# Patient Record
Sex: FEMALE | Race: BLACK OR AFRICAN AMERICAN | Employment: UNEMPLOYED | URBAN - METROPOLITAN AREA
[De-identification: names, ages, dates, MRNs, and addresses within clinical notes are randomized per-mention and may not be internally consistent; named-entity substitution may affect disease eponyms.]

---

## 2020-12-08 LAB
HBA1C MFR BLD HPLC: 6.7 %
LDL-C, EXTERNAL: 144

## 2021-01-26 ENCOUNTER — OFFICE VISIT (OUTPATIENT)
Dept: INTERNAL MEDICINE CLINIC | Age: 66
End: 2021-01-26
Payer: MEDICAID

## 2021-01-26 VITALS
TEMPERATURE: 99.1 F | HEIGHT: 62 IN | WEIGHT: 197 LBS | HEART RATE: 57 BPM | SYSTOLIC BLOOD PRESSURE: 127 MMHG | OXYGEN SATURATION: 98 % | DIASTOLIC BLOOD PRESSURE: 68 MMHG | BODY MASS INDEX: 36.25 KG/M2 | RESPIRATION RATE: 18 BRPM

## 2021-01-26 DIAGNOSIS — F33.9 EPISODE OF RECURRENT MAJOR DEPRESSIVE DISORDER, UNSPECIFIED DEPRESSION EPISODE SEVERITY (HCC): ICD-10-CM

## 2021-01-26 DIAGNOSIS — I49.9 IRREGULAR HEART RHYTHM: ICD-10-CM

## 2021-01-26 DIAGNOSIS — E78.5 HYPERLIPIDEMIA, UNSPECIFIED HYPERLIPIDEMIA TYPE: ICD-10-CM

## 2021-01-26 DIAGNOSIS — F41.9 ANXIETY: ICD-10-CM

## 2021-01-26 DIAGNOSIS — I49.3 PVC (PREMATURE VENTRICULAR CONTRACTION): ICD-10-CM

## 2021-01-26 DIAGNOSIS — G47.00 INSOMNIA, UNSPECIFIED TYPE: ICD-10-CM

## 2021-01-26 DIAGNOSIS — Z00.00 ROUTINE ADULT HEALTH MAINTENANCE: ICD-10-CM

## 2021-01-26 DIAGNOSIS — E11.9 TYPE 2 DIABETES MELLITUS WITHOUT COMPLICATION, WITHOUT LONG-TERM CURRENT USE OF INSULIN (HCC): Primary | ICD-10-CM

## 2021-01-26 PROCEDURE — 99214 OFFICE O/P EST MOD 30 MIN: CPT | Performed by: INTERNAL MEDICINE

## 2021-01-26 RX ORDER — CETIRIZINE HCL 10 MG
10 TABLET ORAL
COMMUNITY

## 2021-01-26 RX ORDER — AMLODIPINE BESYLATE 2.5 MG/1
TABLET ORAL
COMMUNITY
Start: 2020-12-28 | End: 2021-03-03

## 2021-01-26 RX ORDER — EPINEPHRINE 0.3 MG/.3ML
INJECTION SUBCUTANEOUS
COMMUNITY
Start: 2020-12-08

## 2021-01-26 RX ORDER — FLECAINIDE ACETATE 50 MG/1
50 TABLET ORAL 2 TIMES DAILY
COMMUNITY
Start: 2020-12-29

## 2021-01-26 RX ORDER — LAMOTRIGINE 100 MG/1
150 TABLET ORAL DAILY
COMMUNITY

## 2021-01-26 RX ORDER — MONTELUKAST SODIUM 10 MG/1
10 TABLET ORAL DAILY
COMMUNITY

## 2021-01-26 RX ORDER — METOPROLOL TARTRATE 50 MG/1
50 TABLET ORAL 2 TIMES DAILY
COMMUNITY
Start: 2021-01-08 | End: 2022-03-03

## 2021-01-26 RX ORDER — ATORVASTATIN CALCIUM 10 MG/1
TABLET, FILM COATED ORAL
COMMUNITY
Start: 2020-12-16 | End: 2021-06-15 | Stop reason: SDUPTHER

## 2021-01-26 RX ORDER — ZOLPIDEM TARTRATE 5 MG/1
2.5 TABLET ORAL
COMMUNITY
End: 2021-04-14

## 2021-01-26 RX ORDER — LAMOTRIGINE 100 MG/1
TABLET ORAL
COMMUNITY
Start: 2020-12-16 | End: 2021-01-26

## 2021-01-26 RX ORDER — FLUOXETINE HYDROCHLORIDE 20 MG/1
60 CAPSULE ORAL DAILY
COMMUNITY
Start: 2021-01-06 | End: 2022-03-08

## 2021-01-26 RX ORDER — FUROSEMIDE 20 MG/1
TABLET ORAL
COMMUNITY
Start: 2020-12-21 | End: 2021-06-15 | Stop reason: SDUPTHER

## 2021-01-26 RX ORDER — DESVENLAFAXINE 25 MG/1
25 TABLET, EXTENDED RELEASE ORAL DAILY
COMMUNITY
Start: 2021-01-13 | End: 2022-02-03

## 2021-01-26 RX ORDER — ACETAMINOPHEN AND CODEINE PHOSPHATE 300; 30 MG/1; MG/1
TABLET ORAL
COMMUNITY
Start: 2020-11-24 | End: 2021-01-26

## 2021-01-26 NOTE — PROGRESS NOTES
Assessment and Plan   Diagnoses and all orders for this visit:    1. Type 2 diabetes mellitus without complication, without long-term current use of insulin (HCC)  Last A1c 6.7 in December. Currently diet controlled although she has had difficulty with her weight. We discussed potentially starting a GLP-1 today but I wanted to see her labs. She was able to get us those labs and her renal function is good. Recommend discussing starting a GLP-1 with her appointment with [facility] next week    Needs diabetic health maintenance at next visit    2. Hyperlipidemia, unspecified hyperlipidemia type  . We will need to discuss increasing her atorvastatin for goal less than 70 given her diabetes. 3. Irregular heart rhythm  Unclear diagnosis but she is followed by Dr. Philip Waters with [facility] and is currently on flecainide, Lasix, and metoprolol. We will try to get those records to clarify diagnosis. 4. Anxiety  5. Episode of recurrent major depressive disorder, unspecified depression episode severity ([address]  Followed by Iris Gonzalez with [facility] physicians and is currently on lamotrigine, fluoxetine, and Pristiq. Medical release form signed during this visit    6. Insomnia, unspecified type  Uses Ambien as needed. Last fill was in July 7. Routine adult health maintenance  OB/GYN is Harmeet Collazo at [facility]. We will need to discuss Pap, mammo-, and colonoscopy at next visit. We discussed weight loss and dietary changes. She has been trying to adjust her diet with minimal weight loss. Advised to keep a food diary until her visit with Leanne.    labs 12/8/2020  WBC 5.9  Hemoglobin 11.9  MCV 80  Platelet 723    TSH 7.49    Alpha gal panel negative  ABHISHEK with reflex negative.     ESR 39  TPO antibody negative    Sodium 139  Potassium 5.2  Chloride 100  Bicarb 22  BUN 14  Creatinine 0.99  Glucose 142  Calcium 9.9    Total protein 7.1  Albumin 4.4  Total bilirubin less than 0.2  Alk phos 118  AST 22  ALT 32    Total cholesterol 226  Triglyceride 223  HDL 41      A1c 6.7    Benefits, risks, possible drug interactions, and side effects of all new medications were reviewed with the patient. Pt verbalized understanding. Return to clinic: 1 month after starting GLP-1,     An electronic signature was used to authenticate this note. Anatoliy Carrasco MD  [facility]  1/26/2021    Future Appointments   Date Time Provider Sebastian Weiner   2/3/2021 10:00 AM [facility] BS AMB        History of Present Illness   Chief Complaint   Establish care    Bandar Burt is a 60 y.o. female         Review of Systems   Constitutional: Negative for chills and fever. HENT: Negative for hearing loss. Eyes: Negative for blurred vision. Respiratory: Negative for shortness of breath. Cardiovascular: Negative for chest pain. Gastrointestinal: Negative for abdominal pain, blood in stool, constipation, diarrhea, melena, nausea and vomiting. Genitourinary: Negative for dysuria and hematuria. Musculoskeletal: Negative for joint pain. Skin: Negative for rash. Neurological: Negative for headaches. Past Medical History     Allergies   Allergen Reactions    Ace Inhibitors Anaphylaxis     Use an epi pen         Current Outpatient Medications   Medication Sig    EPINEPHrine (EPIPEN) 0.3 mg/0.3 mL injection INJECT INTO OUTER THIGH FOR SEVERE ALLERGIC REACTION. CALL 911 AFTER USE.     amLODIPine (NORVASC) 2.5 mg tablet TAKE 1 TABLET BY MOUTH EVERY DAY    atorvastatin (LIPITOR) 10 mg tablet TAKE 1 TABLET BY MOUTH EVERY EVENING    desvenlafaxine succinate (PRISTIQ) 25 mg ER tablet     FLUoxetine (PROzac) 20 mg capsule 60 mg.    flecainide (TAMBOCOR) 50 mg tablet TAKE 1 TABLET BY MOUTH EVERY 12 HOURS    furosemide (LASIX) 20 mg tablet TAKE 1 TABLET BY MOUTH EVERY DAY    metoprolol tartrate (LOPRESSOR) 50 mg tablet     zolpidem (Ambien) 5 mg tablet Take 2.5 mg by mouth nightly as needed for Sleep.  lamoTRIgine (LaMICtaL) 100 mg tablet Take 150 mg by mouth daily.  cetirizine (ZyrTEC) 10 mg tablet Take 10 mg by mouth daily as needed for Allergies.  montelukast (Singulair) 10 mg tablet Take 10 mg by mouth daily. No current facility-administered medications for this visit. Patient Active Problem List   Diagnosis Code    Type 2 diabetes mellitus without complication, without long-term current use of insulin ([facility]) E11.9    Hyperlipidemia, unspecified hyperlipidemia type E78.5     Past Surgical History:   Procedure Laterality Date    HX BREAST BIOPSY Right     HX GYN      DNC, another procedure for an abnormal pap (?LEEP)    HX TUBAL LIGATION Bilateral       Social History     Tobacco Use    Smoking status: Never Smoker    Smokeless tobacco: Never Used   Substance Use Topics    Alcohol use: Not Currently      Family History   Problem Relation Age of Onset    Heart Disease Father         Physical Exam   Vitals:       Visit Vitals  /68   Pulse (!) 57   Temp 99.1 °F (37.3 °C) (Oral)   Resp 18   Ht 5' 2\" (1.575 m)   Wt 197 lb (89.4 kg)   SpO2 98%   BMI 36.03 kg/m²        Physical Exam  Constitutional:       General: She is not in acute distress. Appearance: She is well-developed. HENT:      Right Ear: Tympanic membrane, ear canal and external ear normal.      Left Ear: Tympanic membrane, ear canal and external ear normal.   Eyes:      Extraocular Movements: Extraocular movements intact. Conjunctiva/sclera: Conjunctivae normal.   Neck:      Musculoskeletal: Neck supple. Cardiovascular:      Rate and Rhythm: Normal rate and regular rhythm. Pulses: Normal pulses. Heart sounds: No murmur. No friction rub. No gallop. Pulmonary:      Effort: No respiratory distress. Breath sounds: No wheezing, rhonchi or rales. Abdominal:      General: Bowel sounds are normal. There is no distension.       Palpations: Abdomen is soft. There is no hepatomegaly, splenomegaly or mass. Tenderness: There is no abdominal tenderness. There is no guarding. Skin:     General: Skin is warm. Findings: No rash. Neurological:      Mental Status: She is alert. Psychiatric:         Mood and Affect: Mood normal.         Thought Content:  Thought content normal.         Judgment: Judgment normal.

## 2021-01-27 ENCOUNTER — TELEPHONE (OUTPATIENT)
Dept: INTERNAL MEDICINE CLINIC | Age: 66
End: 2021-01-27

## 2021-01-27 NOTE — TELEPHONE ENCOUNTER
Called and spoke to Nkiita Rush with [facility] last office note and last echo received and placed in providers corespondence folder to be reviewed.

## 2021-01-27 NOTE — TELEPHONE ENCOUNTER
----- Message from Faye Syed MD sent at 5/92/9164  4:13 PM EST -----  Regarding: cards records  Please get last office note and echo if available from her cardiologist Dr. Juliet Becerra with [facility] (near [facility]). I'm trying to figure out what her actual diagnosis is and why she's on flecanide. Thanks!

## 2021-01-28 PROBLEM — G47.00 INSOMNIA, UNSPECIFIED TYPE: Status: ACTIVE | Noted: 2021-01-28

## 2021-01-28 PROBLEM — F41.9 ANXIETY: Status: ACTIVE | Noted: 2021-01-28

## 2021-01-28 PROBLEM — F33.9 EPISODE OF RECURRENT MAJOR DEPRESSIVE DISORDER, UNSPECIFIED DEPRESSION EPISODE SEVERITY (HCC): Status: ACTIVE | Noted: 2021-01-28

## 2021-02-03 ENCOUNTER — OFFICE VISIT (OUTPATIENT)
Dept: INTERNAL MEDICINE CLINIC | Age: 66
End: 2021-02-03

## 2021-02-03 DIAGNOSIS — E11.9 TYPE 2 DIABETES MELLITUS WITHOUT COMPLICATION, WITHOUT LONG-TERM CURRENT USE OF INSULIN (HCC): Primary | ICD-10-CM

## 2021-02-03 RX ORDER — DULAGLUTIDE 0.75 MG/.5ML
0.75 INJECTION, SOLUTION SUBCUTANEOUS
Qty: 4 PEN | Refills: 0 | Status: SHIPPED | OUTPATIENT
Start: 2021-02-03 | End: 2021-02-08

## 2021-02-03 RX ORDER — SEMAGLUTIDE 1.34 MG/ML
0.5 INJECTION, SOLUTION SUBCUTANEOUS
Qty: 1 PEN | Refills: 1 | Status: SHIPPED | OUTPATIENT
Start: 2021-02-03 | End: 2021-02-03 | Stop reason: CLARIF

## 2021-02-08 NOTE — PROGRESS NOTES
CC:  Diabetes management/education    S/O: Jaime Thomas is a 60 y.o. female referred by Dr. Ashok Palmer MD for diabetes management. HPI: Patient here for her initial visit with me. Referred by PCP to discuss GLP-1. Patient has never been on any medication for diabetes. She is currently on an individual insurnace plan and will tranisition to medicare in July. Current Diabetes Regimen:  None    ROS:   Patient denies hypoglycemic and hyperglycemic signs/symptoms, chest pain, shortness of breath, neuropathy, vision changes, and any foot changes. Self-Monitoring Blood Glucose:  Not checking    Diet:  Patient tries to eat lower carb/more non-starchy vegetables    Data reviewed:  Lab Results   Component Value Date/Time    Hemoglobin A1c, External 6.7 12/08/2020         Diabetes Health Maintenance: Address at next visit  Last eye exam:  Last foot exam:  Last influenza vaccine:  Last Pneumovax 23 vaccine:  Last Prevnar-13 vaccine:  Hepatitis B Series:   ASA Therapy:  ACE/ARB Therapy:  Statin Therapy:    Assessment/Plan:     1. Diabetes:  a1c at goal <7% per ADA guidelines. Patient interested in GLP-1 for additional weight loss benefit. Patient has NOT tried metformin before and unfortunately insurance will not cover without failing trial of metformin or inadequate response. Will do sample of Ozempic 0.25mg (x2 pens) once weekly to see if it's helpful and perhaps to get patient to July when her insurance changes and would likely be able to get medication. Patient to follow up with me in 1 month. Reviewed mechanism, side effects, injection technique and confirmed no contraindications. Several prescriptions sent pharmacy to check pricing prior to realizing that insurance would not cover any of them. Patient verbalized understanding of the information presented and all of the patients questions were answered. AVS was handed to the patient and information reviewed.       Patient advised to call me or Dr. Leann Hleler MD with any additional questions or concerns. Follow-up: 1 month  Notification of recommendations will be sent to Dr. Leann Heller MD for review.       Sasha Vazquez, PharmD, BCPS, [facility]    CLINICAL PHARMACY CONSULT: MED RECONCILIATION/REVIEW ADDENDUM    For Pharmacy Admin Tracking Only    PHSO: PHSO Patient?: No  Total # of Interventions Recommended: Count: 2  - New Order #: 1 New Medication Order Reason(s): Needs Additional Medication Therapy  Total Interventions Accepted: 2  Time Spent (min): 45

## 2021-02-16 ENCOUNTER — TELEPHONE (OUTPATIENT)
Dept: INTERNAL MEDICINE CLINIC | Age: 66
End: 2021-02-16
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
## 2021-03-03 ENCOUNTER — TELEPHONE (OUTPATIENT)
Dept: INTERNAL MEDICINE CLINIC | Age: 66
End: 2021-03-03

## 2021-03-03 ENCOUNTER — OFFICE VISIT (OUTPATIENT)
Dept: INTERNAL MEDICINE CLINIC | Age: 66
End: 2021-03-03

## 2021-03-03 VITALS
WEIGHT: 192 LBS | BODY MASS INDEX: 35.33 KG/M2 | DIASTOLIC BLOOD PRESSURE: 78 MMHG | HEART RATE: 64 BPM | SYSTOLIC BLOOD PRESSURE: 128 MMHG | HEIGHT: 62 IN

## 2021-03-03 DIAGNOSIS — E11.9 TYPE 2 DIABETES MELLITUS WITHOUT COMPLICATION, WITHOUT LONG-TERM CURRENT USE OF INSULIN (HCC): Primary | ICD-10-CM

## 2021-03-03 RX ORDER — SEMAGLUTIDE 1.34 MG/ML
0.5 INJECTION, SOLUTION SUBCUTANEOUS
COMMUNITY
End: 2021-07-15

## 2021-03-03 NOTE — PROGRESS NOTES
Patient seen with PharmD Equilla Saint. Please see her note for more detailed information from visit. I was present for the visit and agree with the assessment and plan.     Charli Bentley PharmD, BCPS, [facility]

## 2021-03-03 NOTE — TELEPHONE ENCOUNTER
----- Message from Eulalio Francois sent at 3/3/2021 12:52 PM EST -----  Regarding: Dixon Finney MD  General Message/Vendor Calls    Caller's first and last name: Gracie Decker [[id number]]      Reason for call: Appt confirmation      Callback required yes/no and why: Yes      Best contact number(s):  [phone]      Details to clarify the request: pt advised she is on the way to her 9:95JC appt with Dixon Finney - MD Eulalio Francois

## 2021-03-04 NOTE — PROGRESS NOTES
Pharmacy Progress Note - Diabetes Management     S/O: Ms. Enrique Marie is a 60 y.o. female, referred by Dr. Yue He MD seen today for diabetes management. Patient's last A1c was 6.7% on 12/8/2020. Interim History:   She is a new patient to the office who established care with Dr. Shonda Casillas on 4/25/4473. In the past, patient was able to manage her diabetes through diet alone but recently began Ozempic for added weight loss benefit in February. Patient has not tried metformin before and insurance will not cover without failing trial of metformin or inadequate response. She was given samples of Ozempic 0.25 mg to begin at last visit. She reports some indigestion in the beginning. She is tolerating it now and it is working to decrease her appetite. Since starting the medication and has lost 5 pounds since last visit. When reviewing patients medications, patient reports that she has stopped her amlodipine 2.5 mg daily from her old PCP Dr. Volodymyr Moore because it did not make her feel well. Also, while reviewing medications, also discussed the overlapping serotonin properties of fluoxetine and desvenlafaxine. Patient is stable on the combination and is follow by psychiatry (MD Rasta Moore retired, now Dr. Angelica Godinez). Patient and PCP aware of interaction. Her ACE allergy was updated to include specifically angioedema. She describes episodes of face and throat swelling that resolved when she discontinued ACE treatment. Patient's son is a pharmacist for a independent pharmacy in [place].     Current Anti-Hyperglycemic Medications:    -    Ozempic 0.25 mg weekly      Adherence: yes  Adverse Effects:  no  Cost Barriers:  yes - sampling Ozempic until insurance change in July    ROS:  - Symptoms of Hyperglycemia: none  - Symptoms of Hypoglycemia: none      Self Monitoring Blood Glucose (SMBG) or CGM:  - Brought in home glucometer/blood glucose log/CGM reader today:  no  - Patient recently was diet-controlled and is not currently checking blood sugars    Nutrition/Lifestyle Modifications:  - Patient reports a lower apetitie and feeling full quicker since starting Ozempic. Vitals: Wt Readings from Last 3 Encounters:   03/03/21 192 lb (87.1 kg)   01/26/21 197 lb (89.4 kg)     BP Readings from Last 3 Encounters:   03/03/21 128/78   01/26/21 127/68       Current Medications:  Current Outpatient Medications   Medication Sig    semaglutide (Ozempic) 0.25 mg/0.2 mL (2 mg/1.5 mL) sub-q pen 0.25 mg by SubCUTAneous route every seven (7) days.  atorvastatin (LIPITOR) 10 mg tablet TAKE 1 TABLET BY MOUTH EVERY EVENING    desvenlafaxine succinate (PRISTIQ) 25 mg ER tablet     FLUoxetine (PROzac) 20 mg capsule 60 mg.    flecainide (TAMBOCOR) 50 mg tablet TAKE 1 TABLET BY MOUTH EVERY 12 HOURS    furosemide (LASIX) 20 mg tablet TAKE 1 TABLET BY MOUTH EVERY DAY    metoprolol tartrate (LOPRESSOR) 50 mg tablet 50 mg two (2) times a day.  montelukast (Singulair) 10 mg tablet Take 10 mg by mouth daily.  lamoTRIgine (LaMICtaL) 100 mg tablet Take 150 mg by mouth daily.  cetirizine (ZyrTEC) 10 mg tablet Take 10 mg by mouth daily as needed for Allergies.  EPINEPHrine (EPIPEN) 0.3 mg/0.3 mL injection INJECT INTO OUTER THIGH FOR SEVERE ALLERGIC REACTION. CALL 911 AFTER USE.  zolpidem (Ambien) 5 mg tablet Take 2.5 mg by mouth nightly as needed for Sleep. No current facility-administered medications for this visit. A/P:    Diabetes Management:  - Per ADA guidelines, Pt's A1c is at goal of < 7% and patient does not check blood sugars.  - Since using Ozempic for primarily weight-loss, patient will remain on Ozempic 0.25 mg weekly as it has been effective in helping her lose weight (5 lbs) and tolerated at this dose. - Continue Ozempic 0.25 mg weekly - patient has sample medication remaining but was given additional sample pen needles.   Will try for insurance coverage when patient gets new insurance in July. Hypertension:  - Patient blood pressure at goal of <130/80.  - Patient discontinued her Amlodipine 2.5 mg daily because she could not tolerate it. Recommended to patient to contact her cardiologist to update him even though her previous PCP Dr. Tish Carmen had prescribed it. - Continue Lopressor 50 mg twice a day, Lasix 20 mg once a day and Flecainide 50 mg every 12 hours as directed by cardiologist Dr. Tiffanie Correa with [facility]     PCP planning to address diabetic health maintanence at visit on 4/14/21. Medication reconciliation was completed during the visit. Patient verbalized understanding of informations and all questions were answered. AVS given with patient advised to contact office with questions/concerns. Notifications of recommendations will be sent to Dr. Raul Sosa MD for review. Patient will follow up in 6 week(s) after PCP Dr. Shaneka Fregoso follow up. Thank you for the consult,  Hazel Olivares, PharmD  Clinical Pharmacist Specialist    CLINICAL PHARMACY CONSULT: MED RECONCILIATION/REVIEW ADDENDUM    For Pharmacy Admin Tracking Only    PHSO: PHSO Patient?: No  Total # of Interventions Recommended: Count: 2  Total Interventions Accepted: 2  Time Spent (min): 30

## 2021-03-05 ENCOUNTER — PATIENT MESSAGE (OUTPATIENT)
Dept: INTERNAL MEDICINE CLINIC | Age: 66
End: 2021-03-05

## 2021-03-06 NOTE — TELEPHONE ENCOUNTER
Pharmacy Progress Note - Telephone Encounter    S/O: Ms. Hollingsworth Nim 60 y.o. female was contacted today by telephone to check in on her in response to her message Friday. A/P:  - She reports feeling better this morning but yesterday felt very tired with little energy and unable to go to the store. - She is not reporting any GI side effects from her Ozempic. - She denies any fever, muscle twitching or mental confusion.  - She is going to reach out the her psych Dr. Justino Eagle to discuss her medications. - She has follow up appointment scheduled with PCP Dr. Audi Howard on 9/32/55. I let her know that I would route Dr. Audi Howard the notes of our conversation for follow up. Thank you,  Hazel Rasmussen, PharmD  Clinical Pharmacist Specialist      CLINICAL PHARMACY CONSULT: MED RECONCILIATION/REVIEW ADDENDUM    For Pharmacy Admin Tracking Only    PHSO: PHSO Patient?: No  Total # of Interventions Recommended: Count: 1   Total Interventions Accepted: 1  Time Spent (min): 15

## 2021-03-11 ENCOUNTER — OFFICE VISIT (OUTPATIENT)
Dept: INTERNAL MEDICINE CLINIC | Age: 66
End: 2021-03-11
Payer: COMMERCIAL

## 2021-03-11 VITALS
SYSTOLIC BLOOD PRESSURE: 151 MMHG | WEIGHT: 190 LBS | OXYGEN SATURATION: 90 % | DIASTOLIC BLOOD PRESSURE: 76 MMHG | HEIGHT: 62 IN | RESPIRATION RATE: 16 BRPM | BODY MASS INDEX: 34.96 KG/M2 | TEMPERATURE: 98.4 F | HEART RATE: 66 BPM

## 2021-03-11 DIAGNOSIS — K22.9 ESOPHAGUS DISORDER: Primary | ICD-10-CM

## 2021-03-11 DIAGNOSIS — E11.9 TYPE 2 DIABETES MELLITUS WITHOUT COMPLICATION, WITHOUT LONG-TERM CURRENT USE OF INSULIN (HCC): ICD-10-CM

## 2021-03-11 PROCEDURE — 99214 OFFICE O/P EST MOD 30 MIN: CPT | Performed by: INTERNAL MEDICINE

## 2021-03-11 NOTE — PROGRESS NOTES
Assessment and Plan   Diagnoses and all orders for this visit:    1. Esophagus disorder  -     XR BA SWALLOW ESOPHOGRAM; Future  Diabetes  Reports a sensation that she describes as \"indigestion. \"  Onset a few weeks ago. Feels like she has something stuck in her upper chest that will not move. Did not come on suddenly. Denies any pain, dysphagia, odynophagia, regurgitation. No fever, chills, night sweats, unexpected weight loss. She had a previous EGD for reflux symptoms but this does not seem similar to those symptoms. Denies any postnasal drip. Unclear etiology. Recommend barium swallow to evaluate for structural issues. We did recently start her on Ozempic but I do not think this is a side effect from [address]. However, recommend holding Ozempic until we get more information to see if symptoms resolve off Ozempic. Does not sound like reflux. If work-up negative and symptoms still persistent, consider GI referral    Benefits, risks, possible drug interactions, and side effects of all new medications were reviewed with the patient. Pt verbalized understanding. Return to clinic: Pending work-up    An electronic signature was used to authenticate this note. Shannon Bean MD  [facility]  3/11/2021    Future Appointments   Date Time Provider Sebastian Paigei   9/10/8015  6:35 PM Bhavani John MD [facility] BS AMB   4/14/2021  1:30 PM Brisa Poe PHARMD [facility] BS AMB        Subjective   Chief Complaint   \"Something is stuck\"    Sukhwinder Living is a 60 y.o. female           Objective   Vitals:       Visit Vitals  BP (!) 151/76 (BP 1 Location: Left upper arm, BP Patient Position: Sitting, BP Cuff Size: Adult)   Pulse 66   Temp 98.4 °F (36.9 °C) (Oral)   Resp 16   Ht 5' 2\" (1.575 m)   Wt 190 lb (86.2 kg)   SpO2 90%   BMI 34.75 kg/m²        Physical Exam  Constitutional:       Appearance: Normal appearance. She is not ill-appearing.    HENT: Mouth/Throat:      Mouth: Mucous membranes are moist.      Pharynx: Oropharynx is clear. No posterior oropharyngeal erythema. Cardiovascular:      Rate and Rhythm: Normal rate and regular rhythm. Heart sounds: No murmur. No friction rub. No gallop. Pulmonary:      Effort: No respiratory distress. Breath sounds: Normal breath sounds. No wheezing, rhonchi or rales. Abdominal:      General: Bowel sounds are normal. There is no distension. Palpations: Abdomen is soft. There is no mass. Tenderness: There is no abdominal tenderness. There is no guarding. Lymphadenopathy:      Cervical: No cervical adenopathy. Neurological:      Mental Status: She is alert. Current Outpatient Medications   Medication Sig    semaglutide (Ozempic) 0.25 mg/0.2 mL (2 mg/1.5 mL) sub-q pen 0.25 mg by SubCUTAneous route every seven (7) days.  EPINEPHrine (EPIPEN) 0.3 mg/0.3 mL injection INJECT INTO OUTER THIGH FOR SEVERE ALLERGIC REACTION. CALL 911 AFTER USE.  atorvastatin (LIPITOR) 10 mg tablet TAKE 1 TABLET BY MOUTH EVERY EVENING    desvenlafaxine succinate (PRISTIQ) 25 mg ER tablet     FLUoxetine (PROzac) 20 mg capsule 60 mg.    furosemide (LASIX) 20 mg tablet TAKE 1 TABLET BY MOUTH EVERY DAY    metoprolol tartrate (LOPRESSOR) 50 mg tablet 50 mg two (2) times a day.  zolpidem (Ambien) 5 mg tablet Take 2.5 mg by mouth nightly as needed for Sleep.  montelukast (Singulair) 10 mg tablet Take 10 mg by mouth daily.  lamoTRIgine (LaMICtaL) 100 mg tablet Take 150 mg by mouth daily.  cetirizine (ZyrTEC) 10 mg tablet Take 10 mg by mouth daily as needed for Allergies.  flecainide (TAMBOCOR) 50 mg tablet Indications: NOT TAKING     No current facility-administered medications for this visit.

## 2021-03-25 ENCOUNTER — APPOINTMENT (OUTPATIENT)
Dept: GENERAL RADIOLOGY | Age: 66
End: 2021-03-25
Attending: INTERNAL MEDICINE

## 2021-04-13 NOTE — PROGRESS NOTES
Assessment and Plan   Diagnoses and all orders for this visit:    1. Essential hypertension  -     BSHSI MYCHART BP FLOWSHEET  Elevated today. She had stopped her amlodipine but has recently restarted it. Continue amlodipine 2.5 mg and metoprolol 50 mg twice a day and Lasix 20 mg daily. Recommend monitoring blood pressure at home and sending me blood pressure medications. May need to increase amlodipine    2. Indigestion  3. Obesity (BMI 30-39. 9)  Symptoms described last visit resolved when she stopped Ozempic. However, she feels the benefits outweigh the possible side effects and restarted the medication 3 weeks ago. Currently tolerating well with no side effects. Recommend continuing. We discussed lifestyle changes and adding more activity to her day. Her  does not want to walk with her. She would rather walk in the morning but she is having hard time waking up in the morning. No further work-up needed for indigestion symptoms. Continue symptomatic    Of note, she had sent a [employer] message regarding nail discoloration. She does have several linear horizontal lines on her thumbnails. She is seeing dermatology [facility] soon    Benefits, risks, possible drug interactions, and side effects of all new medications were reviewed with the patient. Pt verbalized understanding. Return to clinic: 3 months for physical    An electronic signature was used to authenticate this note.   Ness Mei MD  [facility]  4/14/2021    Future Appointments   Date Time Provider Sebastian Weiner   5/34/4426  4:82 AM Greta Cleveland MD [facility] BS AMB        Subjective   Chief Complaint   Indigestion    Julene Fothergill is a 60 y.o. female           Objective   Vitals:       Visit Vitals  BP (!) 150/69 (BP 1 Location: Left upper arm, BP Patient Position: Sitting, BP Cuff Size: Adult)   Pulse 76   Resp 13   Ht 5' 2\" (1.575 m)   Wt 190 lb (86.2 kg)   SpO2 98% BMI 34.75 kg/m²        Physical Exam  Constitutional:       Appearance: Normal appearance. She is not ill-appearing. Cardiovascular:      Rate and Rhythm: Normal rate and regular rhythm. Heart sounds: No murmur. No friction rub. No gallop. Pulmonary:      Effort: No respiratory distress. Breath sounds: Normal breath sounds. No wheezing, rhonchi or rales. Abdominal:      General: Bowel sounds are normal. There is no distension. Palpations: Abdomen is soft. There is no mass. Tenderness: There is no abdominal tenderness. There is no guarding. Neurological:      Mental Status: She is alert. Current Outpatient Medications   Medication Sig    amLODIPine (NORVASC) 2.5 mg tablet Take  by mouth daily.  semaglutide (Ozempic) 0.25 mg/0.2 mL (2 mg/1.5 mL) sub-q pen 0.25 mg by SubCUTAneous route every seven (7) days.  EPINEPHrine (EPIPEN) 0.3 mg/0.3 mL injection INJECT INTO OUTER THIGH FOR SEVERE ALLERGIC REACTION. CALL 911 AFTER USE.  atorvastatin (LIPITOR) 10 mg tablet TAKE 1 TABLET BY MOUTH EVERY EVENING    desvenlafaxine succinate (PRISTIQ) 25 mg ER tablet daily.  FLUoxetine (PROzac) 20 mg capsule 60 mg.    flecainide (TAMBOCOR) 50 mg tablet Take 50 mg by mouth two (2) times a day.  furosemide (LASIX) 20 mg tablet TAKE 1 TABLET BY MOUTH EVERY DAY    metoprolol tartrate (LOPRESSOR) 50 mg tablet 50 mg two (2) times a day.  montelukast (Singulair) 10 mg tablet Take 10 mg by mouth daily.  lamoTRIgine (LaMICtaL) 100 mg tablet Take 150 mg by mouth daily.  cetirizine (ZyrTEC) 10 mg tablet Take 10 mg by mouth daily as needed for Allergies. No current facility-administered medications for this visit.

## 2021-04-14 ENCOUNTER — OFFICE VISIT (OUTPATIENT)
Dept: INTERNAL MEDICINE CLINIC | Age: 66
End: 2021-04-14

## 2021-04-14 ENCOUNTER — OFFICE VISIT (OUTPATIENT)
Dept: INTERNAL MEDICINE CLINIC | Age: 66
End: 2021-04-14
Payer: MEDICAID

## 2021-04-14 VITALS
OXYGEN SATURATION: 98 % | HEART RATE: 76 BPM | WEIGHT: 190 LBS | DIASTOLIC BLOOD PRESSURE: 69 MMHG | BODY MASS INDEX: 34.96 KG/M2 | HEIGHT: 62 IN | SYSTOLIC BLOOD PRESSURE: 150 MMHG | RESPIRATION RATE: 13 BRPM

## 2021-04-14 DIAGNOSIS — E11.9 TYPE 2 DIABETES MELLITUS WITHOUT COMPLICATION, WITHOUT LONG-TERM CURRENT USE OF INSULIN (HCC): Primary | ICD-10-CM

## 2021-04-14 DIAGNOSIS — E66.9 OBESITY (BMI 30-39.9): ICD-10-CM

## 2021-04-14 DIAGNOSIS — I10 ESSENTIAL HYPERTENSION: Primary | ICD-10-CM

## 2021-04-14 DIAGNOSIS — K30 INDIGESTION: ICD-10-CM

## 2021-04-14 PROCEDURE — 99214 OFFICE O/P EST MOD 30 MIN: CPT | Performed by: INTERNAL MEDICINE

## 2021-04-14 RX ORDER — AMLODIPINE BESYLATE 2.5 MG/1
TABLET ORAL DAILY
COMMUNITY
End: 2021-04-26

## 2021-04-14 NOTE — PROGRESS NOTES
CC:  Diabetes management/education    S/O: Shira Roberson is a 60 y.o. female referred by Dr. Israel Tellez MD for diabetes management. HPI: Patient is here for 1 month follow up for diabetes management. She stopped ozempic at first because of belching but has since decided to start back and the side effects are much milder. Patients insurance will change beginning of July and will be able to apply for PAP at that time. Notes that she's not sure if her anxiety/depression is adequately controlled with current meds but plans to discuss this with pyschiatrist Dr. Santiago Hurtado. Reminded patient to discuss being on 2 similar types of meds when she sees her psychiatrist.     Current Diabetes Regimen:  Ozempic 0.25mg once weekly    ROS:   Patient denies hypoglycemic and hyperglycemic signs/symptoms, chest pain, shortness of breath, neuropathy, vision changes, and any foot changes. Self-Monitoring Blood Glucose:  Doesn't check blood sugars    Diet:  Trying to eat smaller amounts  Sometimes will have a cookie or 2 or potato chips  Staying full throughout the day - sometimes skipping lunch because of it    Exercise:  Knows she needs to start moving more    Data reviewed:     Lab Results   Component Value Date/Time    Hemoglobin A1c, External 6.7 12/08/2020           Diabetes Health Maintenance:  Last eye exam: confirm with patient  Last foot exam: confirm with patient  Last influenza vaccine: 10/29/20  Last Pneumovax 23 vaccine: 11/22/13  Last Prevnar-13 vaccine: 12/30/17  Covid Vaccine: Haroon Duncan 2/25/21, 3/18/21  Hepatitis B Series: unknown  ASA Therapy: none  ACE/ARB Therapy: none - allergy to ACEI - angioedema  Statin Therapy: atorvastatin    Assessment/Plan:     1. Diabetes:  a1c at goal <7% per ADA guidelines and patient does not check blood sugars. Increase Ozempic to 0.5mg once weekly to help with both blood sugars and weight loss.  Patient to keep working on weight loss, dietary changes and exercise. Will follow up in 6 weeks to see how things are going with her increased dose of Ozempic. 2 sample pens given today. Patient due to have a1c checked at next visit. Patient verbalized understanding of the information presented and all of the patients questions were answered. AVS was handed to the patient and information reviewed. Patient advised to call me or Dr. Elizabeth De Dios MD with any additional questions or concerns. Follow-up: 6 weeks   Notification of recommendations will be sent to Dr. Elizabeth De Dios MD for review.       Imani Mclean, PharmD, BCPS, CDCES    CLINICAL PHARMACY CONSULT: MED RECONCILIATION/REVIEW ADDENDUM    For Pharmacy Admin Tracking Only    PHSO: PHSO Patient?: No  Total # of Interventions Recommended: Count: 1  - Increased Dose #: 1  Total Interventions Accepted: 1  Time Spent (min): 30

## 2021-04-26 DIAGNOSIS — I10 ESSENTIAL HYPERTENSION: Primary | ICD-10-CM

## 2021-04-26 RX ORDER — AMLODIPINE BESYLATE 10 MG/1
10 TABLET ORAL DAILY
Qty: 30 TAB | Refills: 1 | Status: SHIPPED
Start: 2021-04-26 | End: 2021-05-26

## 2021-05-26 ENCOUNTER — OFFICE VISIT (OUTPATIENT)
Dept: INTERNAL MEDICINE CLINIC | Age: 66
End: 2021-05-26
Payer: MEDICARE

## 2021-05-26 ENCOUNTER — OFFICE VISIT (OUTPATIENT)
Dept: INTERNAL MEDICINE CLINIC | Age: 66
End: 2021-05-26

## 2021-05-26 VITALS
OXYGEN SATURATION: 99 % | HEART RATE: 67 BPM | WEIGHT: 189 LBS | HEIGHT: 62 IN | TEMPERATURE: 98.6 F | SYSTOLIC BLOOD PRESSURE: 147 MMHG | BODY MASS INDEX: 34.78 KG/M2 | DIASTOLIC BLOOD PRESSURE: 76 MMHG

## 2021-05-26 DIAGNOSIS — I10 ESSENTIAL HYPERTENSION: Primary | ICD-10-CM

## 2021-05-26 DIAGNOSIS — E11.9 TYPE 2 DIABETES MELLITUS WITHOUT COMPLICATION, WITHOUT LONG-TERM CURRENT USE OF INSULIN (HCC): Primary | ICD-10-CM

## 2021-05-26 DIAGNOSIS — M79.602 LEFT ARM PAIN: ICD-10-CM

## 2021-05-26 DIAGNOSIS — E11.9 TYPE 2 DIABETES MELLITUS WITHOUT COMPLICATION, WITHOUT LONG-TERM CURRENT USE OF INSULIN (HCC): ICD-10-CM

## 2021-05-26 DIAGNOSIS — M79.605 PAIN IN BOTH LOWER EXTREMITIES: ICD-10-CM

## 2021-05-26 DIAGNOSIS — M79.604 PAIN IN BOTH LOWER EXTREMITIES: ICD-10-CM

## 2021-05-26 DIAGNOSIS — F33.9 EPISODE OF RECURRENT MAJOR DEPRESSIVE DISORDER, UNSPECIFIED DEPRESSION EPISODE SEVERITY (HCC): ICD-10-CM

## 2021-05-26 DIAGNOSIS — Z00.00 ROUTINE ADULT HEALTH MAINTENANCE: ICD-10-CM

## 2021-05-26 PROBLEM — F32.9 MAJOR DEPRESSIVE DISORDER: Status: ACTIVE | Noted: 2021-01-28

## 2021-05-26 PROCEDURE — 99214 OFFICE O/P EST MOD 30 MIN: CPT | Performed by: INTERNAL MEDICINE

## 2021-05-26 RX ORDER — CLOBETASOL PROPIONATE 0.5 MG/G
OINTMENT TOPICAL
COMMUNITY
Start: 2021-04-15

## 2021-05-26 RX ORDER — AMLODIPINE AND VALSARTAN 10; 160 MG/1; MG/1
1 TABLET ORAL DAILY
Qty: 30 TABLET | Refills: 1 | Status: SHIPPED
Start: 2021-05-26 | End: 2021-05-28

## 2021-05-26 NOTE — ASSESSMENT & PLAN NOTE
Had her mammogram done with an regular doctors. Had a colonoscopy done in town but she cannot remember with who or when.

## 2021-05-26 NOTE — Clinical Note
Please obtain mammo with [place] [facility]. She also had a colonoscopy in town, but can't remember with whom, but sounds like most like at an [facility] facility.  thanks

## 2021-05-26 NOTE — ASSESSMENT & PLAN NOTE
Started after getting the Covid vaccine. Worse when laying on it.   Still present, recommend continuing to monitor

## 2021-05-26 NOTE — PROGRESS NOTES
Note   Chief Complaint   High blood pressure    Kareem Logan is a 60 y.o. female     1. Essential hypertension  Assessment & Plan:  Not at goal.  Switch to amlodipine-valsartan . Continue metoprolol 50 mg twice a day (has a history of PVCs). Monitor like she is doing. Get labs in 2 weeks. Orders:  -     amLODIPine-valsartan (EXFORGE)  mg per tablet; Take 1 Tablet by mouth daily. Indications: high blood pressure, Normal, Disp-30 Tablet, R-1  -     CBC WITH AUTOMATED DIFF; Future  -     METABOLIC PANEL, COMPREHENSIVE; Future  2. Type 2 diabetes mellitus without complication, without long-term current use of insulin ([facility] [address]  Assessment & Plan:  Check labs with next lab check in 2 weeks  Orders:  -     HEMOGLOBIN A1C WITH EAG; Future  -     LIPID PANEL; Future  3. Pain in both lower extremities  Assessment & Plan:  Had mentioned some leg pain in her MyChart messages  have since resolved. Continue to monitor  4. Left arm pain  Assessment & Plan:  Started after getting the Covid vaccine. Worse when laying on it. Still present, recommend continuing to monitor  5. Episode of recurrent major depressive disorder, unspecified depression episode severity ([facility] [address]  Assessment & Plan:  Patient feels her medications are not optimally controlling her symptoms. Advised to discuss with her psychiatrist.  Also recommend discussing with them options that may lead to less weight gain. 6. Routine adult health maintenance  Assessment & Plan:  Had her mammogram done with an regular doctors. Had a colonoscopy done in town but she cannot remember with who or when. Benefits, risks, possible drug interactions, and side effects of all new medications were reviewed with the patient. Pt verbalized understanding. Return to clinic: 2 weeks for lab draw, 1 month for appointment    An electronic signature was used to authenticate this note.   Brian Pan MD  [facility]  5/26/2021    Future Appointments   Date Time Provider Sebastian Weiner   9/78/2297  8:30 PM Giuliana Sepulveda MD [facility] BS AMB   6/29/2021  1:30 PM Saqib Berg PHARMD [facility] BS [facility]   6/37/6048  5:68 AM Giuliana Sepulveda MD [facility] BS AMB        Objective   Vitals:       Visit Vitals  BP (!) 147/76 (BP 1 Location: Left arm, BP Patient Position: Sitting, BP Cuff Size: Large adult)   Pulse 67   Temp 98.6 °F (37 °C) (Oral)   Ht 5' 2\" (1.575 m)   Wt 189 lb (85.7 kg)   SpO2 99%   BMI 34.57 kg/m²        Physical Exam  Constitutional:       Appearance: Normal appearance. She is not ill-appearing. Cardiovascular:      Rate and Rhythm: Normal rate and regular rhythm. Heart sounds: No murmur heard. No friction rub. No gallop. Pulmonary:      Effort: No respiratory distress. Breath sounds: Normal breath sounds. No wheezing, rhonchi or rales. Neurological:      Mental Status: She is alert. Current Outpatient Medications   Medication Sig    clobetasoL (TEMOVATE) 0.05 % ointment     amLODIPine-valsartan (EXFORGE)  mg per tablet Take 1 Tablet by mouth daily. Indications: high blood pressure    semaglutide (Ozempic) 0.25 mg/0.2 mL (2 mg/1.5 mL) sub-q pen 0.5 mg by SubCUTAneous route every seven (7) days.  atorvastatin (LIPITOR) 10 mg tablet TAKE 1 TABLET BY MOUTH EVERY EVENING    desvenlafaxine succinate (PRISTIQ) 25 mg ER tablet Take 25 mg by mouth daily.  FLUoxetine (PROzac) 20 mg capsule Take 60 mg by mouth daily.  flecainide (TAMBOCOR) 50 mg tablet Take 50 mg by mouth two (2) times a day.  furosemide (LASIX) 20 mg tablet TAKE 1 TABLET BY MOUTH EVERY DAY    metoprolol tartrate (LOPRESSOR) 50 mg tablet 50 mg two (2) times a day.  montelukast (Singulair) 10 mg tablet Take 10 mg by mouth daily.  lamoTRIgine (LaMICtaL) 100 mg tablet Take 150 mg by mouth daily.     cetirizine (ZyrTEC) 10 mg tablet Take 10 mg by mouth daily as needed for Allergies.  EPINEPHrine (EPIPEN) 0.3 mg/0.3 mL injection INJECT INTO OUTER THIGH FOR SEVERE ALLERGIC REACTION. CALL 911 AFTER USE. (Patient not taking: Reported on 5/26/2021)     No current facility-administered medications for this visit.

## 2021-05-26 NOTE — PROGRESS NOTES
Identified pt with two pt identifiers(name and ). Reviewed record in preparation for visit and have obtained necessary documentation. Chief Complaint   Patient presents with    Follow-up    Hypertension        Vitals:    21 1408   BP: (!) 147/76   Pulse: 67   Temp: 98.6 °F (37 °C)   TempSrc: Oral   SpO2: 99%   Weight: 189 lb (85.7 kg)   Height: 5' 2\" (1.575 m)   PainSc:   0 - No pain       Health Maintenance Due   Topic    Hepatitis C Screening     Foot Exam Q1     MICROALBUMIN Q1     Eye Exam Retinal or Dilated     DTaP/Tdap/Td series (1 - Tdap)    PAP AKA CERVICAL CYTOLOGY     Colorectal Cancer Screening Combo     Breast Cancer Screen Mammogram     Bone Densitometry (Dexa) Screening        Coordination of Care Questionnaire:  :   1) Have you been to an emergency room, urgent care, or hospitalized since your last visit? If yes, where when, and reason for visit?no    2. Have seen or consulted any other health care provider since your last visit?    If yes, where when, and reason for visit?  no

## 2021-05-26 NOTE — PATIENT INSTRUCTIONS
1) Check award letters to see what income is for you and your  - may qualify for patient assistance program

## 2021-05-26 NOTE — PATIENT INSTRUCTIONS
For your blood pressure:  Stop amlodipine  Start combination pill amlodipine-valsartan  Come back to clinic in 2 weeks to get bloodwork done (fasting). We will also get a urine test for your diabetes.

## 2021-05-26 NOTE — PROGRESS NOTES
Pharmacist Visit for Diabetes Management & Education    S/O: Vinod Samano is a 60 y.o. female referred by Dr. Fredo Mcginnis MD for diabetes management. Patient last seen by me 4/14/21. She has done well on Ozempic and will qualify for Medicare in July and be able to get the medication through her insurance. States that she is seeing PCP today too because her BP has been running above goal. Patient also interested in discussing a psych option she has been reading about - Ketamine. HTN Meds:  Amlodipine 10mg once daily  Metoprolol 50mg twice daily  Furosemide 20mg daily -- has been taking daily - old PCP gave to her for fluid - hasn't had any fluid issues  --angioedema with ACE-I    BP readings 143/67 -- 140's over 60's  HR - 70's    Current Diabetes Regimen:  Using ozempic 0.25mg once weekly - missed a Saturday- now doing Sundays  Has a partial and 1 whole pen    ROS:   Patient denies hypoglycemic and hyperglycemic signs/symptoms, chest pain, shortness of breath, neuropathy, vision changes, and any foot changes. Self-Monitoring Blood Glucose:  Not checking blood sugars    Diet:  Trying to make positive changes - decreasing carbs/increasing non-starchy vegetables. Exercise:  Gets treadmill delivered today      Data reviewed:  Lab Results   Component Value Date/Time    Hemoglobin A1c, External 6.7 12/08/2020 12:00 AM       Diabetes Health Maintenance:  Last eye exam: confirm with patient  Immunizations:  Immunization History   Administered Date(s) Administered    COVID-19, PFIZER, MRNA, LNP-S, PF, 30MCG/0.3ML DOSE 02/25/2021, 03/18/2021    Influenza Vaccine 10/29/2020    Pneumococcal Conjugate (PCV-13) 12/30/2017    Pneumococcal Polysaccharide (PPSV-23) 11/22/2013    Td 08/14/1995    Zoster Recombinant 12/11/2020, 02/10/2021       Statin - ACEI/ARB - ASA  Key CAD CHF Meds             amLODIPine (NORVASC) 10 mg tablet (Taking/Discontinued) Take 1 Tab by mouth daily.  Indications: high blood pressure    atorvastatin (LIPITOR) 10 mg tablet (Taking) TAKE 1 TABLET BY MOUTH EVERY EVENING    flecainide (TAMBOCOR) 50 mg tablet (Taking) Take 50 mg by mouth two (2) times a day. furosemide (LASIX) 20 mg tablet (Taking) TAKE 1 TABLET BY MOUTH EVERY DAY    metoprolol tartrate (LOPRESSOR) 50 mg tablet (Taking) 50 mg two (2) times a day. Assessment/Plan:     1. Diabetes: a1c at goal <7% per ADA guidelines, however, is due to be rechecked now. PCP ordered today. Patient tolerating Ozempic - will continue at 0.25mg once weekly. Will follow up with patient in 1 month to make sure we're working on access for [address] going forward. Patient to check financial documents so that we know what we're dealing with. 2. Hypertension: patient seeing PCP today to discuss changes to regimen. Will default to PCP for changes to hypertension meds. 3. Depression: patient followed by psychiatry. Briefly discussed the limitations to ketamine therapy and advised her to follow up with psychiatrist and PCP with any additional questions. Patient verbalized understanding of the information presented and all of the patients questions were answered. AVS was handed to the patient and information reviewed. Patient advised to call me or Dr. Elissa Naylor MD with any additional questions or concerns. Follow-up: 6/29/2021   Notification of recommendations will be sent to Dr. Elissa Naylor MD for review. Suzie Colbert, PharmD, BCPS, [address] in place:  Yes   Recommendation Provided To: Patient/Caregiver: 1 via In person   Intervention Detail: Scheduled Appointment   Total # of Interventions Recommended: 1   Total # of Interventions Accepted: 1   Intervention Accepted By: Patient/Caregiver: 1   Time Spent (min): 30

## 2021-05-26 NOTE — ASSESSMENT & PLAN NOTE
Not at goal.  Switch to amlodipine-valsartan . Continue metoprolol 50 mg twice a day (has a history of PVCs). Monitor like she is doing. Get labs in 2 weeks.

## 2021-05-26 NOTE — ASSESSMENT & PLAN NOTE
Patient feels her medications are not optimally controlling her symptoms. Advised to discuss with her psychiatrist.  Also recommend discussing with them options that may lead to less weight gain.

## 2021-05-28 ENCOUNTER — TELEPHONE (OUTPATIENT)
Dept: INTERNAL MEDICINE CLINIC | Age: 66
End: 2021-05-28

## 2021-05-28 DIAGNOSIS — I10 ESSENTIAL HYPERTENSION: Primary | ICD-10-CM

## 2021-05-28 DIAGNOSIS — I10 ESSENTIAL HYPERTENSION: ICD-10-CM

## 2021-05-28 RX ORDER — HYDROCHLOROTHIAZIDE 12.5 MG/1
12.5 TABLET ORAL DAILY
Qty: 30 TABLET | Refills: 1 | Status: SHIPPED
Start: 2021-05-28 | End: 2021-06-29 | Stop reason: DRUGHIGH

## 2021-05-28 NOTE — TELEPHONE ENCOUNTER
Patient states she was told to hold off on taking new medicine as they were supposed to replace it with something else. She said she never got a call back and was wondering why they want her to wait on taking the meds. She believes it has to do with her amLODIPine-valsartan medication. Please call back and advise.

## 2021-05-28 NOTE — TELEPHONE ENCOUNTER
Per PCP, medical record request sent [facility] for a copy of patient's most recent colonoscopy report & applicable pathology report. [facility] Electronic Routing Function fax results report shows a successful transmission of the medical record request.     Per PCP, medical record request sent via hard fax line to Medical Records for [facility] for a copy of the patient's most recent mammography report.  Fax results shows a successful transmission of the medical record request.

## 2021-05-28 NOTE — TELEPHONE ENCOUNTER
Called patient. Recommend hydrochlorothiazide over valsartan. Patient is agreeable. Continue amlodipine 10 mg and start hydrochlorothiazide 12.5. Patient verbalized understanding. We will get labs in 2 weeks and follow-up in a month.

## 2021-05-28 NOTE — ASSESSMENT & PLAN NOTE
5/28/2021 Called patient. Recommend hydrochlorothiazide over valsartan. Patient is agreeable. Continue amlodipine 10 mg and start hydrochlorothiazide 12.5. Patient verbalized understanding. We will get labs in 2 weeks and follow-up in a month.

## 2021-06-15 RX ORDER — FUROSEMIDE 20 MG/1
TABLET ORAL
Qty: 90 TABLET | Refills: 1 | Status: SHIPPED | OUTPATIENT
Start: 2021-06-15 | End: 2021-12-21

## 2021-06-15 RX ORDER — ATORVASTATIN CALCIUM 10 MG/1
TABLET, FILM COATED ORAL
Qty: 90 TABLET | Refills: 3 | Status: SHIPPED | OUTPATIENT
Start: 2021-06-15 | End: 2021-08-10 | Stop reason: SDUPTHER

## 2021-06-24 ENCOUNTER — TELEPHONE (OUTPATIENT)
Dept: INTERNAL MEDICINE CLINIC | Age: 66
End: 2021-06-24

## 2021-06-24 NOTE — TELEPHONE ENCOUNTER
Spoke to pt over the phone. Reports she will have tos witch to due insurance issues. Advised that I can send in prescription refills until she establishes with a new doctor. She can also call our office for ozempic samples until she establishes with someone else.

## 2021-06-29 DIAGNOSIS — I10 ESSENTIAL HYPERTENSION: Primary | ICD-10-CM

## 2021-06-29 RX ORDER — AMLODIPINE BESYLATE 5 MG/1
5 TABLET ORAL DAILY
Qty: 30 TABLET | Refills: 1 | Status: SHIPPED
Start: 2021-06-29 | End: 2021-06-30

## 2021-06-29 RX ORDER — HYDROCHLOROTHIAZIDE 25 MG/1
25 TABLET ORAL DAILY
Qty: 30 TABLET | Refills: 1 | Status: SHIPPED | OUTPATIENT
Start: 2021-06-29 | End: 2021-08-12 | Stop reason: SDUPTHER

## 2021-07-15 DIAGNOSIS — I10 ESSENTIAL HYPERTENSION: ICD-10-CM

## 2021-07-15 RX ORDER — SEMAGLUTIDE 1.34 MG/ML
0.25 INJECTION, SOLUTION SUBCUTANEOUS
COMMUNITY
Start: 2021-07-15 | End: 2021-08-10

## 2021-07-15 RX ORDER — HYDROCHLOROTHIAZIDE 25 MG/1
25 TABLET ORAL DAILY
Qty: 30 TABLET | Refills: 1 | Status: SHIPPED | OUTPATIENT
Start: 2021-07-15 | End: 2021-08-03

## 2021-07-29 ENCOUNTER — PATIENT MESSAGE (OUTPATIENT)
Dept: INTERNAL MEDICINE CLINIC | Age: 66
End: 2021-07-29

## 2021-07-29 DIAGNOSIS — G47.00 INSOMNIA, UNSPECIFIED TYPE: Primary | ICD-10-CM

## 2021-07-30 RX ORDER — ZOLPIDEM TARTRATE 5 MG/1
5 TABLET ORAL
Qty: 90 TABLET | Refills: 0 | Status: SHIPPED
Start: 2021-07-30 | End: 2021-11-03

## 2021-08-03 ENCOUNTER — OFFICE VISIT (OUTPATIENT)
Dept: INTERNAL MEDICINE CLINIC | Age: 66
End: 2021-08-03
Payer: MEDICARE

## 2021-08-03 ENCOUNTER — OFFICE VISIT (OUTPATIENT)
Dept: INTERNAL MEDICINE CLINIC | Age: 66
End: 2021-08-03

## 2021-08-03 VITALS
HEIGHT: 62 IN | TEMPERATURE: 97.3 F | BODY MASS INDEX: 33.86 KG/M2 | DIASTOLIC BLOOD PRESSURE: 80 MMHG | HEART RATE: 69 BPM | WEIGHT: 184 LBS | SYSTOLIC BLOOD PRESSURE: 132 MMHG | OXYGEN SATURATION: 98 % | RESPIRATION RATE: 14 BRPM

## 2021-08-03 DIAGNOSIS — Z00.00 WELCOME TO MEDICARE PREVENTIVE VISIT: Primary | ICD-10-CM

## 2021-08-03 DIAGNOSIS — I10 ESSENTIAL HYPERTENSION: ICD-10-CM

## 2021-08-03 DIAGNOSIS — E11.9 TYPE 2 DIABETES MELLITUS WITHOUT COMPLICATION, WITHOUT LONG-TERM CURRENT USE OF INSULIN (HCC): ICD-10-CM

## 2021-08-03 DIAGNOSIS — E66.9 OBESITY (BMI 30-39.9): ICD-10-CM

## 2021-08-03 DIAGNOSIS — Z11.59 NEED FOR HEPATITIS C SCREENING TEST: ICD-10-CM

## 2021-08-03 DIAGNOSIS — E11.9 TYPE 2 DIABETES MELLITUS WITHOUT COMPLICATION, WITHOUT LONG-TERM CURRENT USE OF INSULIN (HCC): Primary | ICD-10-CM

## 2021-08-03 LAB
ALBUMIN SERPL-MCNC: 4.1 G/DL (ref 3.5–5)
ALBUMIN/GLOB SERPL: 1.1 {RATIO} (ref 1.1–2.2)
ALP SERPL-CCNC: 118 U/L (ref 45–117)
ALT SERPL-CCNC: 56 U/L (ref 12–78)
ANION GAP SERPL CALC-SCNC: 6 MMOL/L (ref 5–15)
AST SERPL-CCNC: 27 U/L (ref 15–37)
BASOPHILS # BLD: 0 K/UL (ref 0–0.1)
BASOPHILS NFR BLD: 1 % (ref 0–1)
BILIRUB SERPL-MCNC: 0.3 MG/DL (ref 0.2–1)
BUN SERPL-MCNC: 13 MG/DL (ref 6–20)
BUN/CREAT SERPL: 12 (ref 12–20)
CALCIUM SERPL-MCNC: 9.9 MG/DL (ref 8.5–10.1)
CHLORIDE SERPL-SCNC: 102 MMOL/L (ref 97–108)
CHOLEST SERPL-MCNC: 177 MG/DL
CO2 SERPL-SCNC: 30 MMOL/L (ref 21–32)
CREAT SERPL-MCNC: 1.05 MG/DL (ref 0.55–1.02)
CREAT UR-MCNC: 148 MG/DL
DIFFERENTIAL METHOD BLD: ABNORMAL
EOSINOPHIL # BLD: 0.1 K/UL (ref 0–0.4)
EOSINOPHIL NFR BLD: 2 % (ref 0–7)
ERYTHROCYTE [DISTWIDTH] IN BLOOD BY AUTOMATED COUNT: 14.1 % (ref 11.5–14.5)
EST. AVERAGE GLUCOSE BLD GHB EST-MCNC: 134 MG/DL
GLOBULIN SER CALC-MCNC: 3.7 G/DL (ref 2–4)
GLUCOSE SERPL-MCNC: 94 MG/DL (ref 65–100)
HBA1C MFR BLD: 6.3 % (ref 4–5.6)
HCT VFR BLD AUTO: 36.1 % (ref 35–47)
HCV AB SERPL QL IA: NONREACTIVE
HCV COMMENT,HCGAC: NORMAL
HDLC SERPL-MCNC: 52 MG/DL
HDLC SERPL: 3.4 {RATIO} (ref 0–5)
HGB BLD-MCNC: 11.7 G/DL (ref 11.5–16)
IMM GRANULOCYTES # BLD AUTO: 0 K/UL (ref 0–0.04)
IMM GRANULOCYTES NFR BLD AUTO: 0 % (ref 0–0.5)
LDLC SERPL CALC-MCNC: 107.4 MG/DL (ref 0–100)
LYMPHOCYTES # BLD: 1.7 K/UL (ref 0.8–3.5)
LYMPHOCYTES NFR BLD: 30 % (ref 12–49)
MCH RBC QN AUTO: 25.9 PG (ref 26–34)
MCHC RBC AUTO-ENTMCNC: 32.4 G/DL (ref 30–36.5)
MCV RBC AUTO: 79.9 FL (ref 80–99)
MICROALBUMIN UR-MCNC: 1.32 MG/DL
MICROALBUMIN/CREAT UR-RTO: 9 MG/G (ref 0–30)
MONOCYTES # BLD: 0.4 K/UL (ref 0–1)
MONOCYTES NFR BLD: 7 % (ref 5–13)
NEUTS SEG # BLD: 3.3 K/UL (ref 1.8–8)
NEUTS SEG NFR BLD: 60 % (ref 32–75)
NRBC # BLD: 0 K/UL (ref 0–0.01)
NRBC BLD-RTO: 0 PER 100 WBC
PLATELET # BLD AUTO: 276 K/UL (ref 150–400)
PMV BLD AUTO: 11.7 FL (ref 8.9–12.9)
POTASSIUM SERPL-SCNC: 4.1 MMOL/L (ref 3.5–5.1)
PROT SERPL-MCNC: 7.8 G/DL (ref 6.4–8.2)
RBC # BLD AUTO: 4.52 M/UL (ref 3.8–5.2)
SODIUM SERPL-SCNC: 138 MMOL/L (ref 136–145)
TRIGL SERPL-MCNC: 88 MG/DL (ref ?–150)
UR CULT HOLD, URHOLD: NORMAL
VLDLC SERPL CALC-MCNC: 17.6 MG/DL
WBC # BLD AUTO: 5.5 K/UL (ref 3.6–11)

## 2021-08-03 PROCEDURE — 1090F PRES/ABSN URINE INCON ASSESS: CPT | Performed by: INTERNAL MEDICINE

## 2021-08-03 PROCEDURE — G9717 DOC PT DX DEP/BP F/U NT REQ: HCPCS | Performed by: INTERNAL MEDICINE

## 2021-08-03 PROCEDURE — G8536 NO DOC ELDER MAL SCRN: HCPCS | Performed by: INTERNAL MEDICINE

## 2021-08-03 PROCEDURE — G0438 PPPS, INITIAL VISIT: HCPCS | Performed by: INTERNAL MEDICINE

## 2021-08-03 PROCEDURE — G8417 CALC BMI ABV UP PARAM F/U: HCPCS | Performed by: INTERNAL MEDICINE

## 2021-08-03 PROCEDURE — G8427 DOCREV CUR MEDS BY ELIG CLIN: HCPCS | Performed by: INTERNAL MEDICINE

## 2021-08-03 NOTE — ASSESSMENT & PLAN NOTE
Has been working on diet. Not as active as she would like to be.   Continue with some back and lifestyle changes

## 2021-08-03 NOTE — PROGRESS NOTES
Note   Chief Complaint   MCW    Constanza Mccarthy is a 73 y.o. female     1. Welcome to Medicare preventive visit  Assessment & Plan:  Discussed getting the pneumonia vaccine. She gets her Paps and mammois with her OB/GYN. Release signed to get mammogram report. She did have a colonoscopy done but she cannot remember with who. Discussed advanced directives. She has paperwork. 2. Essential hypertension  Assessment & Plan:  Swelling better. Tolerating medications well. Well-controlled. Continue Lasix 20 daily, hydrochlorothiazide 25 daily, metoprolol 50 mg twice a day. No changes recommended  Orders:  -     METABOLIC PANEL, COMPREHENSIVE; Future  3. Type 2 diabetes mellitus without complication, without long-term current use of insulin ([facility] [address]  Assessment & Plan:  Has had her eye exam since last visit. Currently on Ozempic 0.25 every 7 days. Seeing mildred after this visit  Orders:  -     CBC WITH AUTOMATED DIFF; Future  -     HEMOGLOBIN A1C WITH EAG; Future  -     LIPID PANEL; Future  -     MICROALBUMIN, UR, RAND W/ MICROALB/CREAT RATIO; Future  4. Need for hepatitis C screening test  -     HEPATITIS C AB; Future  5. Obesity (BMI 30-39. 9)  Assessment & Plan:  Has been working on diet. Not as active as she would like to be. Continue with some back and lifestyle changes       Benefits, risks, possible drug interactions, and side effects of all new medications were reviewed with the patient. Pt verbalized understanding. Return to clinic: 6 months for weight, BP, DEXA    An electronic signature was used to authenticate this note.   Pippa Ruiz MD  [facility]  8/3/2021    Future Appointments   Date Time Provider Sebastian Weiner   11/3/2021 11:00 AM Sahra Naylor [facility] AM[facility]   8/2/8203  3:55 AM Grabiel Serna MD [facility]        Objective   Vitals:       Visit Vitals  /80   Pulse 69   Temp 97.3 °F (36.3 °C) (Temporal)   Resp 14 Ht 5' 2\" (1.575 m)   Wt 184 lb (83.5 kg)   SpO2 98%   BMI 33.65 kg/m²        Physical Exam  Constitutional:       General: She is not in acute distress. Appearance: She is well-developed. HENT:      Right Ear: Tympanic membrane, ear canal and external ear normal.      Left Ear: Tympanic membrane, ear canal and external ear normal.   Eyes:      Extraocular Movements: Extraocular movements intact. Conjunctiva/sclera: Conjunctivae normal.   Cardiovascular:      Rate and Rhythm: Normal rate and regular rhythm. Pulses: Normal pulses. Heart sounds: No murmur heard. No friction rub. No gallop. Pulmonary:      Effort: No respiratory distress. Breath sounds: No wheezing, rhonchi or rales. Abdominal:      General: Bowel sounds are normal. There is no distension. Palpations: Abdomen is soft. There is no hepatomegaly, splenomegaly or mass. Tenderness: There is no abdominal tenderness. There is no guarding. Musculoskeletal:      Cervical back: Neck supple. Skin:     General: Skin is warm. Findings: No rash. Neurological:      Mental Status: She is alert. Current Outpatient Medications   Medication Sig    zolpidem (AMBIEN) 5 mg tablet Take 1 Tablet by mouth nightly as needed for Sleep. Max Daily Amount: 5 mg.  semaglutide (Ozempic) 0.25 mg/0.2 mL (2 mg/1.5 mL) sub-q pen 0.25 mg by SubCUTAneous route every seven (7) days.  hydroCHLOROthiazide (HYDRODIURIL) 25 mg tablet Take 1 Tablet by mouth daily.  atorvastatin (LIPITOR) 10 mg tablet TAKE 1 TABLET BY MOUTH EVERY EVENING    furosemide (LASIX) 20 mg tablet TAKE 1 TABLET BY MOUTH EVERY DAY    clobetasoL (TEMOVATE) 0.05 % ointment     EPINEPHrine (EPIPEN) 0.3 mg/0.3 mL injection INJECT INTO OUTER THIGH FOR SEVERE ALLERGIC REACTION. CALL 911 AFTER USE. (Patient not taking: Reported on 8/3/2021)    desvenlafaxine succinate (PRISTIQ) 25 mg ER tablet Take 25 mg by mouth daily.     FLUoxetine (PROzac) 20 mg capsule Take 60 mg by mouth daily.  metoprolol tartrate (LOPRESSOR) 50 mg tablet 50 mg two (2) times a day.  montelukast (Singulair) 10 mg tablet Take 10 mg by mouth daily.  lamoTRIgine (LaMICtaL) 100 mg tablet Take 150 mg by mouth daily.  cetirizine (ZyrTEC) 10 mg tablet Take 10 mg by mouth daily as needed for Allergies.  flecainide (TAMBOCOR) 50 mg tablet Take 50 mg by mouth two (2) times a day. No current facility-administered medications for this visit. This is a \"Welcome to [employer]"  Initial Preventive Physical Examination (IPPE) providing Personalized Prevention Plan Services (Performed in the first 12 months of enrollment)    I have reviewed the patient's medical history in detail and updated the computerized patient record. Assessment/Plan   Education and counseling provided:  Are appropriate based on today's review and evaluation    1. Welcome to Medicare preventive visit  Assessment & Plan:  Discussed getting the pneumonia vaccine. She gets her Paps and mammois with her OB/GYN. Release signed to get mammogram report. She did have a colonoscopy done but she cannot remember with who. Discussed advanced directives. She has paperwork. 2. Essential hypertension  Assessment & Plan:  Swelling better. Tolerating medications well. Well-controlled. Continue Lasix 20 daily, hydrochlorothiazide 25 daily, metoprolol 50 mg twice a day. No changes recommended  Orders:  -     METABOLIC PANEL, COMPREHENSIVE; Future  3. Type 2 diabetes mellitus without complication, without long-term current use of insulin ([facility] [address]  Assessment & Plan:  Has had her eye exam since last visit. Currently on Ozempic 0.25 every 7 days. Seeing mildred after this visit  Orders:  -     CBC WITH AUTOMATED DIFF; Future  -     HEMOGLOBIN A1C WITH EAG; Future  -     LIPID PANEL; Future  -     MICROALBUMIN, UR, RAND W/ MICROALB/CREAT RATIO; Future  4.  Need for hepatitis C screening test  -     HEPATITIS C AB; Future  5. Obesity (BMI 30-39. 9)  Assessment & Plan:  Has been working on diet. Not as active as she would like to be. Continue with some back and lifestyle changes       Depression Risk Screen     3 most recent PHQ Screens 4/14/2021   Little interest or pleasure in doing things Not at all   Feeling down, depressed, irritable, or hopeless Several days   Total Score PHQ 2 1       Alcohol Risk Screen    Do you average more than 1 drink per night or more than 7 drinks a week:  No    On any one occasion in the past three months have you have had more than 3 drinks containing alcohol:  No          Functional Ability and Level of Safety    Diet: No special diet      Hearing: Hearing is good. Vision Screening:  Vision is good. No exam data present      Activities of Daily Living: The home contains: no safety equipment. Patient does total self care      Ambulation: with no difficulty      Exercise level: sedentary     Fall Risk Screen:  Fall Risk Assessment, last 12 mths 4/14/2021   Able to walk? Yes   Fall in past 12 months? 0   Do you feel unsteady? 0   Are you worried about falling 0      Abuse Screen:  Patient is not abused       Screening EKG   EKG order placed: No    End of Life Planning   Advanced care planning directives were discussed with the patient and /or family/caregiver.      Health Maintenance Due     Health Maintenance Due   Topic Date Due    Hepatitis C Screening  Never done    Foot Exam Q1  Never done    MICROALBUMIN Q1  Never done    Eye Exam Retinal or Dilated  Never done    PAP AKA CERVICAL CYTOLOGY  Never done    DTaP/Tdap/Td series (1 - Tdap) 08/15/1995    Colorectal Cancer Screening Combo  Never done    Breast Cancer Screen Mammogram  Never done    Bone Densitometry (Dexa) Screening  Never done    Pneumococcal 65+ years (2 of 2 - PPSV23) 07/29/2021       Patient Care Team   Patient Care Team:  Armando Clark MD as PCP - General (Internal Medicine)  Armando Clark, MD as PCP - [facility] Provider  Rj Cheng, PHARMD (Pharmacist)    History   History reviewed. No pertinent past medical history. Past Surgical History:   Procedure Laterality Date    HX BREAST BIOPSY Right     HX GYN      DNC, another procedure for an abnormal pap (?LEEP)    HX TUBAL LIGATION Bilateral      Current Outpatient Medications   Medication Sig Dispense Refill    zolpidem (AMBIEN) 5 mg tablet Take 1 Tablet by mouth nightly as needed for Sleep. Max Daily Amount: 5 mg. 90 Tablet 0    semaglutide (Ozempic) 0.25 mg/0.2 mL (2 mg/1.5 mL) sub-q pen 0.25 mg by SubCUTAneous route every seven (7) days.  hydroCHLOROthiazide (HYDRODIURIL) 25 mg tablet Take 1 Tablet by mouth daily. 30 Tablet 1    atorvastatin (LIPITOR) 10 mg tablet TAKE 1 TABLET BY MOUTH EVERY EVENING 90 Tablet 3    furosemide (LASIX) 20 mg tablet TAKE 1 TABLET BY MOUTH EVERY DAY 90 Tablet 1    clobetasoL (TEMOVATE) 0.05 % ointment       EPINEPHrine (EPIPEN) 0.3 mg/0.3 mL injection INJECT INTO OUTER THIGH FOR SEVERE ALLERGIC REACTION. CALL 911 AFTER USE. (Patient not taking: Reported on 8/3/2021)      desvenlafaxine succinate (PRISTIQ) 25 mg ER tablet Take 25 mg by mouth daily.  FLUoxetine (PROzac) 20 mg capsule Take 60 mg by mouth daily.  metoprolol tartrate (LOPRESSOR) 50 mg tablet 50 mg two (2) times a day.  montelukast (Singulair) 10 mg tablet Take 10 mg by mouth daily.  lamoTRIgine (LaMICtaL) 100 mg tablet Take 150 mg by mouth daily.  cetirizine (ZyrTEC) 10 mg tablet Take 10 mg by mouth daily as needed for Allergies.  flecainide (TAMBOCOR) 50 mg tablet Take 50 mg by mouth two (2) times a day.        Allergies   Allergen Reactions    Ace Inhibitors Anaphylaxis and Angioedema     Use an epi pen        Family History   Problem Relation Age of Onset    Heart Disease Father      Social History     Tobacco Use    Smoking status: Never Smoker    Smokeless tobacco: Never Used   Substance Use Topics    Alcohol use: Not Currently       Cali Brown MD

## 2021-08-03 NOTE — ASSESSMENT & PLAN NOTE
Discussed getting the pneumonia vaccine. She gets her Paps and mammois with her OB/GYN. Release signed to get mammogram report. She did have a colonoscopy done but she cannot remember with who. Discussed advanced directives. She has paperwork.

## 2021-08-03 NOTE — PROGRESS NOTES
Pharmacist Visit for Diabetes Management & Education    S/O: Naila Mendez is a 73 y.o. female referred by Dr. Sherin Martinez MD for diabetes management. Patient is here for a brief visit following her PCP follow up today. She is down 13lbs in 8 months. Notes that she doesn't have a lot of motivation at this time. She was walking on the treadmill regularly but lately is feeling very tired. She now has medicare and is able to get Ozempic through her insurance. She will not qualify for PAP. Household - her and ,  retired  Thinks their salary >68k     Current Diabetes Regimen:  Ozempic 0.25mg once weekly  --3 month supply for $179      ROS:   Patient denies hypoglycemic and hyperglycemic signs/symptoms, chest pain, shortness of breath, neuropathy, vision changes, and any foot changes.     Self-Monitoring Blood Glucose:  Not checking    Diet:  Still trying to make healthier choices    Exercise:  Walking on the treadmill more - was doing really well with the shows while she was on it        Data reviewed:  Lab Results   Component Value Date/Time    Hemoglobin A1c 6.3 (H) 08/03/2021 09:37 AM    Hemoglobin A1c, External 6.7 12/08/2020 12:00 AM    Glucose 94 08/03/2021 09:37 AM    Microalbumin/Creat ratio (mg/g creat) 9 08/03/2021 09:37 AM    Microalbumin,urine random 1.32 08/03/2021 09:37 AM    LDL, calculated 107.4 (H) 08/03/2021 09:37 AM    Creatinine 1.05 (H) 08/03/2021 09:37 AM       Diabetes Health Maintenance:  Last eye exam:      Immunizations:  Immunization History   Administered Date(s) Administered    COVID-19, PFIZER, MRNA, LNP-S, PF, 30MCG/0.3ML DOSE 02/25/2021, 03/18/2021    Influenza Vaccine 10/29/2020    Pneumococcal Conjugate (PCV-13) 12/30/2017    Pneumococcal Polysaccharide (PPSV-23) 11/22/2013    Td 08/14/1995    Zoster Recombinant 12/11/2020, 02/10/2021       Statin - ACEI/ARB - ASA  Key CAD CHF Meds             hydroCHLOROthiazide (HYDRODIURIL) 25 mg tablet (Taking) Take 1 Tablet by mouth daily. atorvastatin (LIPITOR) 10 mg tablet (Taking) TAKE 1 TABLET BY MOUTH EVERY EVENING    furosemide (LASIX) 20 mg tablet (Taking) TAKE 1 TABLET BY MOUTH EVERY DAY    flecainide (TAMBOCOR) 50 mg tablet (Taking) Take 50 mg by mouth two (2) times a day. metoprolol tartrate (LOPRESSOR) 50 mg tablet (Taking) 50 mg two (2) times a day. Assessment/Plan:     1. Diabetes:  a1c at goal <7% and has continued to improve. Patient is not checking blood sugars. Encouraged patient to continue working on dietary changes along with exercise and to continue Ozempic 0.25mg once weekly. We will follow up in 3 months to ensure she is still on track. Patient to call me sooner if any issues. Patient verbalized understanding of the information presented and all of the patients questions were answered. AVS was handed to the patient and information reviewed. Patient advised to call me or Dr. Panfilo Desai MD with any additional questions or concerns. Follow-up: 11/3/2021    Notification of recommendations will be sent to Dr. Panfilo Desai MD for review. Cornelio Freed, PharmD, BCPS, [facility] [address] in place:  Yes   Recommendation Provided To: Patient/Caregiver: 1 via In person   Intervention Detail: Scheduled Appointment   Intervention Accepted By: Patient/Caregiver: 1   Time Spent (min): 30

## 2021-08-03 NOTE — ACP (ADVANCE CARE PLANNING)
Advance Care Planning     Advance Care Planning (ACP) Physician/NP/PA Conversation      Date of Conversation: 8/3/2021  Conducted with: Patient with Decision Making Capacity    Healthcare Decision Maker:     Click here to complete [address] including selection of the [address] Relationship (ie \"Primary\")  Today we documented Decision Maker(s) consistent with Legal Next of Kin hierarchy. Care Preferences:    Hospitalization: \"If your health worsens and it becomes clear that your chance of recovery is unlikely, what would be your preference regarding hospitalization? \"  The patient would prefer comfort-focused treatment without hospitalization. Ventilation: \"If you were unable to breathe on your own and your chance of recovery was unlikely, what would be your preference about the use of a ventilator (breathing machine) if it was available to you? \"   The patient would NOT desire the use of a ventilator. Resuscitation: \"In the event your heart stopped as a result of an underlying serious health condition, would you want attempts to be made to restart your heart, or would you prefer a natural death? \"   Yes, attempt to resuscitate.     Additional topics discussed: artificial nutrition   \"I don't want to be maintained artificially\"     Conversation Outcomes / Follow-Up Plan:   ACP in process - completing/providing documents   Reviewed DNR/DNI and patient elects Full Code (Attempt Resuscitation)     Length of Voluntary ACP Conversation in minutes:  <16 minutes (Non-Billable)    Audie Morrow MD

## 2021-08-03 NOTE — ASSESSMENT & PLAN NOTE
Has had her eye exam since last visit. Currently on Ozempic 0.25 every 7 days.   Seeing mildred after this visit

## 2021-08-03 NOTE — PATIENT INSTRUCTIONS
Medicare Wellness Visit, Female     The best way to live healthy is to have a lifestyle where you eat a well-balanced diet, exercise regularly, limit alcohol use, and quit all forms of tobacco/nicotine, if applicable. Regular preventive services are another way to keep healthy. Preventive services (vaccines, screening tests, monitoring & exams) can help personalize your care plan, which helps you manage your own care. Screening tests can find health problems at the earliest stages, when they are easiest to treat. [facility] follows the current, evidence-based guidelines published by the [facility] Cardenas ([facility]) when recommending preventive services for our patients. Because we follow these guidelines, sometimes recommendations change over time as research supports it. (For example, mammograms used to be recommended annually. Even though Medicare will still pay for an annual mammogram, the newer guidelines recommend a mammogram every two years for women of average risk). Of course, you and your doctor may decide to screen more often for some diseases, based on your risk and your co-morbidities (chronic disease you are already diagnosed with). Preventive services for you include:  - Medicare offers their members a free annual wellness visit, which is time for you and your primary care provider to discuss and plan for your preventive service needs. Take advantage of this benefit every year!  -All adults over the age of 72 should receive the recommended pneumonia vaccines. Current USPSTF guidelines recommend a series of two vaccines for the best pneumonia protection.   -All adults should have a flu vaccine yearly and a tetanus vaccine every 10 years.   -All adults age 48 and older should receive the shingles vaccines (series of two vaccines).       -All adults age 38-68 who are overweight should have a diabetes screening test once every three years.   -All adults born between [birth year] and 1964 should be screened once for Hepatitis C.  -Other screening tests and preventive services for persons with diabetes include: an eye exam to screen for diabetic retinopathy, a kidney function test, a foot exam, and stricter control over your cholesterol.   -Cardiovascular screening for adults with routine risk involves an electrocardiogram (ECG) at intervals determined by your doctor.   -Colorectal cancer screenings should be done for adults age 54-65 with no increased risk factors for colorectal cancer. There are a number of acceptable methods of screening for this type of cancer. Each test has its own benefits and drawbacks. Discuss with your doctor what is most appropriate for you during your annual wellness visit. The different tests include: colonoscopy (considered the best screening method), a fecal occult blood test, a fecal DNA test, and sigmoidoscopy.    -A bone mass density test is recommended when a woman turns 65 to screen for osteoporosis. This test is only recommended one time, as a screening. Some providers will use this same test as a disease monitoring tool if you already have osteoporosis. -Breast cancer screenings are recommended every other year for women of normal risk, age 54-69.  -Cervical cancer screenings for women over age 72 are only recommended with certain risk factors.      Here is a list of your current Health Maintenance items (your personalized list of preventive services) with a due date:  Health Maintenance Due   Topic Date Due    Hepatitis C Test  Never done    Diabetic Foot Care  Never done    Albumin Urine Test  Never done    Eye Exam  Never done    Pap Test  Never done    DTaP/Tdap/Td  (1 - Tdap) 08/15/1995    Colorectal Screening  Never done    Mammogram  Never done    Bone Mineral Density   Never done    Pneumococcal Vaccine (2 of 2 - PPSV23) 07/29/2021

## 2021-08-03 NOTE — ASSESSMENT & PLAN NOTE
Swelling better. Tolerating medications well. Well-controlled. Continue Lasix 20 daily, hydrochlorothiazide 25 daily, metoprolol 50 mg twice a day.   No changes recommended

## 2021-08-04 NOTE — PROGRESS NOTES
mychart message sent. Microalbumin creatinine ratio normal.  Hep C negative. . CMP normal.  A1c 6.3. Hemoglobin 11.7. MCV 79.9.   CBC otherwise normal.    Increase atorvastatin to 20 if pt agreeable  Cont DM meds

## 2021-08-09 ENCOUNTER — TELEPHONE (OUTPATIENT)
Dept: FAMILY MEDICINE CLINIC | Age: 66
End: 2021-08-09

## 2021-08-09 NOTE — TELEPHONE ENCOUNTER
Pharmacy Progress Note - Telephone Encounter    S/O: Ms. Teofilo Avelar 73 y.o. female, referred by Dr. Panfilo Desai MD, was contacted via an outbound telephone call to discuss sx of hypoglycemia today. Verified patients identifiers (name & ) per HIPAA policy. - Pt sent i4.ms Message to PCP and Cornelio Freed PharmD. This writer is covering Dr. Jesús Camacho while she is on vacation.    - In the [employer] message, pt described sx of hypoglycemia after starting Ozempic. When this writer called, pt reported that PCP's nurse had already called and she was coming into the office to be evaluated. There are no discontinued medications. No orders of the defined types were placed in this encounter.       Fe Boss, PharmD, [facility] [place]  Clinical Pharmacist Specialist      For Pharmacy Admin Tracking Only     Time Spent (min): 5

## 2021-08-10 ENCOUNTER — OFFICE VISIT (OUTPATIENT)
Dept: INTERNAL MEDICINE CLINIC | Age: 66
End: 2021-08-10
Payer: MEDICARE

## 2021-08-10 VITALS
DIASTOLIC BLOOD PRESSURE: 70 MMHG | OXYGEN SATURATION: 97 % | SYSTOLIC BLOOD PRESSURE: 142 MMHG | HEART RATE: 67 BPM | RESPIRATION RATE: 14 BRPM | TEMPERATURE: 98.6 F | HEIGHT: 62 IN | BODY MASS INDEX: 33.75 KG/M2 | WEIGHT: 183.4 LBS

## 2021-08-10 DIAGNOSIS — E78.5 HYPERLIPIDEMIA, UNSPECIFIED HYPERLIPIDEMIA TYPE: ICD-10-CM

## 2021-08-10 DIAGNOSIS — E16.2 HYPOGLYCEMIA: Primary | ICD-10-CM

## 2021-08-10 DIAGNOSIS — E11.9 TYPE 2 DIABETES MELLITUS WITHOUT COMPLICATION, WITHOUT LONG-TERM CURRENT USE OF INSULIN (HCC): ICD-10-CM

## 2021-08-10 PROBLEM — E11.22 TYPE 2 DIABETES MELLITUS WITH CHRONIC KIDNEY DISEASE (HCC): Status: ACTIVE | Noted: 2021-08-10

## 2021-08-10 PROCEDURE — 99214 OFFICE O/P EST MOD 30 MIN: CPT | Performed by: INTERNAL MEDICINE

## 2021-08-10 PROCEDURE — 1101F PT FALLS ASSESS-DOCD LE1/YR: CPT | Performed by: INTERNAL MEDICINE

## 2021-08-10 PROCEDURE — G8400 PT W/DXA NO RESULTS DOC: HCPCS | Performed by: INTERNAL MEDICINE

## 2021-08-10 PROCEDURE — 1090F PRES/ABSN URINE INCON ASSESS: CPT | Performed by: INTERNAL MEDICINE

## 2021-08-10 PROCEDURE — G8417 CALC BMI ABV UP PARAM F/U: HCPCS | Performed by: INTERNAL MEDICINE

## 2021-08-10 PROCEDURE — G8753 SYS BP > OR = 140: HCPCS | Performed by: INTERNAL MEDICINE

## 2021-08-10 PROCEDURE — 2022F DILAT RTA XM EVC RTNOPTHY: CPT | Performed by: INTERNAL MEDICINE

## 2021-08-10 PROCEDURE — 3044F HG A1C LEVEL LT 7.0%: CPT | Performed by: INTERNAL MEDICINE

## 2021-08-10 PROCEDURE — G8754 DIAS BP LESS 90: HCPCS | Performed by: INTERNAL MEDICINE

## 2021-08-10 PROCEDURE — G9717 DOC PT DX DEP/BP F/U NT REQ: HCPCS | Performed by: INTERNAL MEDICINE

## 2021-08-10 PROCEDURE — G8427 DOCREV CUR MEDS BY ELIG CLIN: HCPCS | Performed by: INTERNAL MEDICINE

## 2021-08-10 PROCEDURE — 3017F COLORECTAL CA SCREEN DOC REV: CPT | Performed by: INTERNAL MEDICINE

## 2021-08-10 PROCEDURE — G8536 NO DOC ELDER MAL SCRN: HCPCS | Performed by: INTERNAL MEDICINE

## 2021-08-10 RX ORDER — ATORVASTATIN CALCIUM 20 MG/1
TABLET, FILM COATED ORAL
Qty: 90 TABLET | Refills: 1 | Status: SHIPPED | OUTPATIENT
Start: 2021-08-10 | End: 2022-02-21

## 2021-08-10 NOTE — ASSESSMENT & PLAN NOTE
Reports intermittent episodes in the past where she starts feeling jittery if she has not eaten quick enough. Feels that this has become more frequent over the past 2 weeks and is more intense. She had an episode last week where she ate breakfast and was still feeling a little jittery. She went on the treadmill and symptoms worsened. Improved with eating. Has not changed her diet significantly in this time. Symptoms possibly related to hypoglycemia. Recommend stopping Ozempic. Advised patient to notify us if symptoms not improving off Ozempic.   Advised on keeping hard candies to help reverse hypoglycemia

## 2021-08-10 NOTE — PROGRESS NOTES
Note   Chief Complaint   Jittery    Tracy Echevarria is a 73 y.o. female       1. Hypoglycemia  Assessment & Plan:  Reports intermittent episodes in the past where she starts feeling jittery if she has not eaten quick enough. Feels that this has become more frequent over the past 2 weeks and is more intense. She had an episode last week where she ate breakfast and was still feeling a little jittery. She went on the treadmill and symptoms worsened. Improved with eating. Has not changed her diet significantly in this time. Symptoms possibly related to hypoglycemia. Recommend stopping Ozempic. Advised patient to notify us if symptoms not improving off Ozempic. Advised on keeping hard candies to help reverse hypoglycemia  2. Type 2 diabetes mellitus without complication, without long-term current use of insulin ([address]  Assessment & Plan:  See hypoglycemia note. Stopping Ozempic  3. Hyperlipidemia, unspecified hyperlipidemia type  Assessment & Plan:   last check. Recommend increasing atorvastatin to 20 mg for goal less than 70 for history of diabetes  Orders:  -     atorvastatin (LIPITOR) 20 mg tablet; TAKE 1 TABLET BY MOUTH EVERY EVENING  Indications: high cholesterol, Normal, Disp-90 Tablet, R-1       Benefits, risks, possible drug interactions, and side effects of all new medications were reviewed with the patient. Pt verbalized understanding. Return to clinic: As scheduled or earlier if needed, plan for lipid check    An electronic signature was used to authenticate this note.   Rogelio Quiroz MD  [facility]  8/10/2021    Future Appointments   Date Time Provider Sebastian Weiner   11/3/2021 11:00 AM Riley Mishra [facility] BS AMB   5/7/2097  6:89 AM Linda Serna MD [facility] BS AMB        Objective   Vitals:       Visit Vitals  BP (!) 142/70   Pulse 67   Temp 98.6 °F (37 °C) (Oral)   Resp 14   Ht 5' 2\" (1.575 m)   Wt 183 lb 6.4 oz (83.2 kg)   SpO2 97%   BMI 33.54 kg/m²        Physical Exam  Constitutional:       Appearance: Normal appearance. She is not ill-appearing. Cardiovascular:      Rate and Rhythm: Normal rate and regular rhythm. Heart sounds: No murmur heard. No friction rub. No gallop. Pulmonary:      Effort: No respiratory distress. Breath sounds: Normal breath sounds. No wheezing, rhonchi or rales. Neurological:      Mental Status: She is alert. Current Outpatient Medications   Medication Sig    atorvastatin (LIPITOR) 20 mg tablet TAKE 1 TABLET BY MOUTH EVERY EVENING  Indications: high cholesterol    zolpidem (AMBIEN) 5 mg tablet Take 1 Tablet by mouth nightly as needed for Sleep. Max Daily Amount: 5 mg.  hydroCHLOROthiazide (HYDRODIURIL) 25 mg tablet Take 1 Tablet by mouth daily.  furosemide (LASIX) 20 mg tablet TAKE 1 TABLET BY MOUTH EVERY DAY    clobetasoL (TEMOVATE) 0.05 % ointment     EPINEPHrine (EPIPEN) 0.3 mg/0.3 mL injection INJECT INTO OUTER THIGH FOR SEVERE ALLERGIC REACTION. CALL 911 AFTER USE.  desvenlafaxine succinate (PRISTIQ) 25 mg ER tablet Take 25 mg by mouth daily.  FLUoxetine (PROzac) 20 mg capsule Take 60 mg by mouth daily.  flecainide (TAMBOCOR) 50 mg tablet Take 50 mg by mouth two (2) times a day.  metoprolol tartrate (LOPRESSOR) 50 mg tablet 50 mg two (2) times a day.  montelukast (Singulair) 10 mg tablet Take 10 mg by mouth daily.  lamoTRIgine (LaMICtaL) 100 mg tablet Take 150 mg by mouth daily.  cetirizine (ZyrTEC) 10 mg tablet Take 10 mg by mouth daily as needed for Allergies. No current facility-administered medications for this visit.

## 2021-08-10 NOTE — ASSESSMENT & PLAN NOTE
last check.   Recommend increasing atorvastatin to 20 mg for goal less than 70 for history of diabetes

## 2021-08-12 DIAGNOSIS — I10 ESSENTIAL HYPERTENSION: ICD-10-CM

## 2021-08-12 RX ORDER — HYDROCHLOROTHIAZIDE 25 MG/1
25 TABLET ORAL DAILY
Qty: 90 TABLET | Refills: 1 | Status: SHIPPED | OUTPATIENT
Start: 2021-08-12 | End: 2021-09-08 | Stop reason: SDUPTHER

## 2021-09-08 DIAGNOSIS — I10 ESSENTIAL HYPERTENSION: ICD-10-CM

## 2021-09-08 RX ORDER — HYDROCHLOROTHIAZIDE 25 MG/1
25 TABLET ORAL DAILY
Qty: 90 TABLET | Refills: 1 | Status: SHIPPED | OUTPATIENT
Start: 2021-09-08 | End: 2022-05-21

## 2021-09-16 NOTE — PROGRESS NOTES
Note   Chief Complaint   Reflux    Tracy Echevarria is a 73 y.o. female     1. Gastroesophageal reflux disease without esophagitis  Assessment & Plan:  Reports a 1 year history of feeling fluid reflux in her throat, intermittent episodes of difficulty swallowing, hoarseness, and feeling something is on her chest.  No trigger foods. She did have an EGD done several years ago for reflux symptoms at that time. Reports occasional pain with swallowing. Symptoms possibly related to poorly controlled reflux. Recommend starting pantoprazole. Counseled on proper administration. Advised to monitor chest symptoms. Not associated with activity, less likely cardiac but advised patient to let us know symptoms change or worsen. Orders:  -     pantoprazole (PROTONIX) 40 mg tablet; Take 1 Tablet by mouth daily. Indications: reflux, Normal, Disp-30 Tablet, R-1  2. Hypoglycemia  Assessment & Plan:  Ozempic stopped last visit due to possible hypoglycemia episodes. Reports that they have been less frequent since stopping medication although still feels that she needs to make sure not to skip lunch. Continue to monitor       Benefits, risks, possible drug interactions, and side effects of all new medications were reviewed with the patient. Pt verbalized understanding. Return to clinic: As scheduled or earlier if symptoms not improving    An electronic signature was used to authenticate this note.   Rogelio Quiroz MD  [facility]  9/17/2021    Future Appointments   Date Time Provider Sebastian Paigei   11/3/2021 11:00 AM Riley Mishra [facility] BS AMB   7/2/6611  3:40 AM Linda Serna MD [facility] BS AMB        Objective   Vitals:       Visit Vitals  /69 (BP 1 Location: Left upper arm, BP Patient Position: Sitting, BP Cuff Size: Adult)   Pulse 61   Temp 97.2 °F (36.2 °C) (Temporal)   Resp 14   Ht 5' 2\" (1.575 m)   Wt 181 lb (82.1 kg)   SpO2 96%   BMI 33.11 kg/m²        Physical Exam  Constitutional:       Appearance: Normal appearance. She is not ill-appearing. Cardiovascular:      Rate and Rhythm: Normal rate and regular rhythm. Heart sounds: No murmur heard. No friction rub. No gallop. Pulmonary:      Effort: No respiratory distress. Breath sounds: Normal breath sounds. No wheezing, rhonchi or rales. Abdominal:      General: Bowel sounds are normal. There is no distension. Palpations: Abdomen is soft. There is no mass. Tenderness: There is no abdominal tenderness. There is no guarding. Neurological:      Mental Status: She is alert. Current Outpatient Medications   Medication Sig    pantoprazole (PROTONIX) 40 mg tablet Take 1 Tablet by mouth daily. Indications: reflux    hydroCHLOROthiazide (HYDRODIURIL) 25 mg tablet Take 1 Tablet by mouth daily.  atorvastatin (LIPITOR) 20 mg tablet TAKE 1 TABLET BY MOUTH EVERY EVENING  Indications: high cholesterol    zolpidem (AMBIEN) 5 mg tablet Take 1 Tablet by mouth nightly as needed for Sleep. Max Daily Amount: 5 mg.  furosemide (LASIX) 20 mg tablet TAKE 1 TABLET BY MOUTH EVERY DAY    clobetasoL (TEMOVATE) 0.05 % ointment     desvenlafaxine succinate (PRISTIQ) 25 mg ER tablet Take 25 mg by mouth daily.  FLUoxetine (PROzac) 20 mg capsule Take 60 mg by mouth daily.  flecainide (TAMBOCOR) 50 mg tablet Take 50 mg by mouth two (2) times a day.  metoprolol tartrate (LOPRESSOR) 50 mg tablet 50 mg two (2) times a day.  montelukast (Singulair) 10 mg tablet Take 10 mg by mouth daily.  lamoTRIgine (LaMICtaL) 100 mg tablet Take 150 mg by mouth daily.  cetirizine (ZyrTEC) 10 mg tablet Take 10 mg by mouth daily as needed for Allergies.  EPINEPHrine (EPIPEN) 0.3 mg/0.3 mL injection INJECT INTO OUTER THIGH FOR SEVERE ALLERGIC REACTION. CALL 911 AFTER USE. (Patient not taking: Reported on 9/17/2021)     No current facility-administered medications for this visit.

## 2021-09-17 ENCOUNTER — OFFICE VISIT (OUTPATIENT)
Dept: INTERNAL MEDICINE CLINIC | Age: 66
End: 2021-09-17
Payer: MEDICARE

## 2021-09-17 VITALS
SYSTOLIC BLOOD PRESSURE: 132 MMHG | DIASTOLIC BLOOD PRESSURE: 69 MMHG | WEIGHT: 181 LBS | HEART RATE: 61 BPM | OXYGEN SATURATION: 96 % | BODY MASS INDEX: 33.31 KG/M2 | RESPIRATION RATE: 14 BRPM | TEMPERATURE: 97.2 F | HEIGHT: 62 IN

## 2021-09-17 DIAGNOSIS — K21.9 GASTROESOPHAGEAL REFLUX DISEASE WITHOUT ESOPHAGITIS: Primary | ICD-10-CM

## 2021-09-17 DIAGNOSIS — E16.2 HYPOGLYCEMIA: ICD-10-CM

## 2021-09-17 PROCEDURE — G9717 DOC PT DX DEP/BP F/U NT REQ: HCPCS | Performed by: INTERNAL MEDICINE

## 2021-09-17 PROCEDURE — G8400 PT W/DXA NO RESULTS DOC: HCPCS | Performed by: INTERNAL MEDICINE

## 2021-09-17 PROCEDURE — 3017F COLORECTAL CA SCREEN DOC REV: CPT | Performed by: INTERNAL MEDICINE

## 2021-09-17 PROCEDURE — G8754 DIAS BP LESS 90: HCPCS | Performed by: INTERNAL MEDICINE

## 2021-09-17 PROCEDURE — G8752 SYS BP LESS 140: HCPCS | Performed by: INTERNAL MEDICINE

## 2021-09-17 PROCEDURE — G8417 CALC BMI ABV UP PARAM F/U: HCPCS | Performed by: INTERNAL MEDICINE

## 2021-09-17 PROCEDURE — 1101F PT FALLS ASSESS-DOCD LE1/YR: CPT | Performed by: INTERNAL MEDICINE

## 2021-09-17 PROCEDURE — 99214 OFFICE O/P EST MOD 30 MIN: CPT | Performed by: INTERNAL MEDICINE

## 2021-09-17 PROCEDURE — 1090F PRES/ABSN URINE INCON ASSESS: CPT | Performed by: INTERNAL MEDICINE

## 2021-09-17 PROCEDURE — G8536 NO DOC ELDER MAL SCRN: HCPCS | Performed by: INTERNAL MEDICINE

## 2021-09-17 PROCEDURE — G8427 DOCREV CUR MEDS BY ELIG CLIN: HCPCS | Performed by: INTERNAL MEDICINE

## 2021-09-17 RX ORDER — PANTOPRAZOLE SODIUM 40 MG/1
40 TABLET, DELAYED RELEASE ORAL DAILY
Qty: 30 TABLET | Refills: 1 | Status: SHIPPED | OUTPATIENT
Start: 2021-09-17 | End: 2021-11-14 | Stop reason: SDUPTHER

## 2021-09-17 NOTE — ASSESSMENT & PLAN NOTE
Ozempic stopped last visit due to possible hypoglycemia episodes. Reports that they have been less frequent since stopping medication although still feels that she needs to make sure not to skip lunch.   Continue to monitor

## 2021-09-17 NOTE — ASSESSMENT & PLAN NOTE
Reports a 1 year history of feeling fluid reflux in her throat, intermittent episodes of difficulty swallowing, hoarseness, and feeling something is on her chest.  No trigger foods. She did have an EGD done several years ago for reflux symptoms at that time. Reports occasional pain with swallowing. Symptoms possibly related to poorly controlled reflux. Recommend starting pantoprazole. Counseled on proper administration. Advised to monitor chest symptoms. Not associated with activity, less likely cardiac but advised patient to let us know symptoms change or worsen.

## 2021-10-17 LAB — HBA1C MFR BLD HPLC: 6 %

## 2021-11-03 ENCOUNTER — OFFICE VISIT (OUTPATIENT)
Dept: INTERNAL MEDICINE CLINIC | Age: 66
End: 2021-11-03

## 2021-11-03 DIAGNOSIS — E11.9 TYPE 2 DIABETES MELLITUS WITHOUT COMPLICATION, WITHOUT LONG-TERM CURRENT USE OF INSULIN (HCC): Primary | ICD-10-CM

## 2021-11-03 NOTE — PROGRESS NOTES
Pharmacist Visit for Diabetes Management & Education    S/O: Jessica Maguire is a 73 y.o. female referred by Dr. Ofelia Rivera MD for diabetes management. Patient is here for a 3 month follow up. States that she's feeling much better since she stopped taking Ambien. Notes that she will get on the scale and is losing weight but then it will jump up. She is no longer on ozempic - was describing possible hypoglycemia and PCP d/c in August - appears to have resolved since stopping. Current Diabetes Regimen:  none    ROS:   Patient denies hypoglycemic and hyperglycemic signs/symptoms, chest pain, shortness of breath, neuropathy, vision changes, and any foot changes.     Self-Monitoring Blood Glucose:  Not checking    Diet:  Eating more salmon and cutting back   Drinking more water    Exercise:  Using her treadmill more often - hasn't been on it this week, was on it once last week      Data reviewed:  Lab Results   Component Value Date/Time    Hemoglobin A1c 6.3 (H) 08/03/2021 09:37 AM    Hemoglobin A1c, External 6.0 10/17/2021 12:00 AM    Hemoglobin A1c, External 6.7 12/08/2020 12:00 AM    Glucose 94 08/03/2021 09:37 AM    Microalbumin/Creat ratio (mg/g creat) 9 08/03/2021 09:37 AM    Microalbumin,urine random 1.32 08/03/2021 09:37 AM    LDL, calculated 107.4 (H) 08/03/2021 09:37 AM    Creatinine 1.05 (H) 08/03/2021 09:37 AM       Diabetes Health Maintenance:  Last eye exam: saw the eye doctor 2/2 flashes/floaters - they are monitoring it closely - Dr. Franklin Sarmiento:  Immunization History   Administered Date(s) Administered    COVID-19, PFIZER, MRNA, LNP-S, PF, 30MCG/0.3ML DOSE 02/25/2021, 03/18/2021    Influenza Vaccine 10/29/2020    Pneumococcal Conjugate (PCV-13) 12/30/2017    Pneumococcal Polysaccharide (PPSV-23) 11/22/2013    Td 08/14/1995    Zoster Recombinant 12/11/2020, 02/10/2021       Statin - ACEI/ARB - ASA  Key CAD CHF Meds hydroCHLOROthiazide (HYDRODIURIL) 25 mg tablet Take 1 Tablet by mouth daily. atorvastatin (LIPITOR) 20 mg tablet TAKE 1 TABLET BY MOUTH EVERY EVENING  Indications: high cholesterol    furosemide (LASIX) 20 mg tablet TAKE 1 TABLET BY MOUTH EVERY DAY    flecainide (TAMBOCOR) 50 mg tablet Take 50 mg by mouth two (2) times a day. metoprolol tartrate (LOPRESSOR) 50 mg tablet 50 mg two (2) times a day. Assessment/Plan:     1. Diabetes:  a1c at goal <7% per ADA guideliens and patient is not currently checking blood sugars. Advised patient to continue to work on lifestyle changes such as diet and exercise along with weight loss efforts. Patient to follow up as needed. Patient verbalized understanding of the information presented and all of the patients questions were answered. AVS was handed to the patient and information reviewed. Patient advised to call me or Dr. Ofelia Rivera MD with any additional questions or concerns. Follow-up: as needed    Notification of recommendations will be sent to Dr. Ofelia Rivera MD for review. Krysta Jacinto, PharmD, [facility], [facility] [address] in place:  Yes   Recommendation Provided To: Patient/Caregiver: 0 via In person   Intervention Accepted By: Patient/Caregiver: 0   Time Spent (min): 30

## 2021-11-03 NOTE — PROGRESS NOTES
A1c updated per home access health lab draw - to be scanned into media.     Pablo Johnson, PharmD, BCPS, CDCES

## 2021-11-14 DIAGNOSIS — K21.9 GASTROESOPHAGEAL REFLUX DISEASE WITHOUT ESOPHAGITIS: ICD-10-CM

## 2021-11-15 RX ORDER — PANTOPRAZOLE SODIUM 40 MG/1
40 TABLET, DELAYED RELEASE ORAL DAILY
Qty: 30 TABLET | Refills: 1 | Status: SHIPPED | OUTPATIENT
Start: 2021-11-15 | End: 2022-01-17

## 2021-12-13 ENCOUNTER — PATIENT MESSAGE (OUTPATIENT)
Dept: INTERNAL MEDICINE CLINIC | Age: 66
End: 2021-12-13

## 2021-12-14 DIAGNOSIS — E11.9 TYPE 2 DIABETES MELLITUS WITHOUT COMPLICATION, WITHOUT LONG-TERM CURRENT USE OF INSULIN (HCC): ICD-10-CM

## 2021-12-14 DIAGNOSIS — R68.89 OTHER GENERAL SYMPTOMS AND SIGNS: ICD-10-CM

## 2021-12-14 DIAGNOSIS — R71.8 MICROCYTOSIS: Primary | ICD-10-CM

## 2021-12-14 DIAGNOSIS — E61.1 IRON DEFICIENCY: ICD-10-CM

## 2021-12-15 NOTE — TELEPHONE ENCOUNTER
For Ye Dill in place:  Yes   Recommendation Provided To: Patient/Caregiver: 0 via [address] By: Patient/Caregiver: 0   Time Spent (min): 10

## 2021-12-16 LAB
EST. AVERAGE GLUCOSE BLD GHB EST-MCNC: 154 MG/DL
FERRITIN SERPL-MCNC: 15 NG/ML (ref 15–150)
HBA1C MFR BLD: 7 % (ref 4.8–5.6)
IRON SATN MFR SERPL: 12 % (ref 15–55)
IRON SERPL-MCNC: 46 UG/DL (ref 27–139)
TIBC SERPL-MCNC: 371 UG/DL (ref 250–450)
UIBC SERPL-MCNC: 325 UG/DL (ref 118–369)
VIT B12 SERPL-MCNC: 717 PG/ML (ref 232–1245)

## 2021-12-16 RX ORDER — FERROUS SULFATE 325(65) MG
325 TABLET, DELAYED RELEASE (ENTERIC COATED) ORAL
Qty: 90 TABLET | Refills: 1 | Status: SHIPPED | OUTPATIENT
Start: 2021-12-16 | End: 2022-05-26

## 2021-12-16 NOTE — PROGRESS NOTES
mychart message sent. A1c 7%. Iron saturation low. Ferritin 15. B12 717. Start iron supplement.  Still rec appt to discuss symptoms  A1c higher than previous, had stopped ozempic

## 2021-12-21 RX ORDER — FUROSEMIDE 20 MG/1
TABLET ORAL
Qty: 90 TABLET | Refills: 3 | Status: SHIPPED
Start: 2021-12-21 | End: 2022-04-08

## 2022-01-15 DIAGNOSIS — K21.9 GASTROESOPHAGEAL REFLUX DISEASE WITHOUT ESOPHAGITIS: ICD-10-CM

## 2022-01-17 RX ORDER — PANTOPRAZOLE SODIUM 40 MG/1
TABLET, DELAYED RELEASE ORAL
Qty: 30 TABLET | Refills: 1 | Status: SHIPPED | OUTPATIENT
Start: 2022-01-17 | End: 2022-02-04 | Stop reason: SDUPTHER

## 2022-02-03 ENCOUNTER — OFFICE VISIT (OUTPATIENT)
Dept: INTERNAL MEDICINE CLINIC | Age: 67
End: 2022-02-03
Payer: MEDICARE

## 2022-02-03 VITALS
DIASTOLIC BLOOD PRESSURE: 60 MMHG | HEIGHT: 62 IN | HEART RATE: 68 BPM | OXYGEN SATURATION: 98 % | RESPIRATION RATE: 14 BRPM | WEIGHT: 193 LBS | SYSTOLIC BLOOD PRESSURE: 142 MMHG | TEMPERATURE: 97.4 F | BODY MASS INDEX: 35.51 KG/M2

## 2022-02-03 DIAGNOSIS — R26.89 BALANCE PROBLEM: ICD-10-CM

## 2022-02-03 DIAGNOSIS — D50.9 IRON DEFICIENCY ANEMIA, UNSPECIFIED IRON DEFICIENCY ANEMIA TYPE: ICD-10-CM

## 2022-02-03 DIAGNOSIS — I10 ESSENTIAL HYPERTENSION: ICD-10-CM

## 2022-02-03 DIAGNOSIS — E66.01 SEVERE OBESITY (BMI 35.0-35.9 WITH COMORBIDITY) (HCC): ICD-10-CM

## 2022-02-03 DIAGNOSIS — F33.9 EPISODE OF RECURRENT MAJOR DEPRESSIVE DISORDER, UNSPECIFIED DEPRESSION EPISODE SEVERITY (HCC): ICD-10-CM

## 2022-02-03 DIAGNOSIS — E11.9 TYPE 2 DIABETES MELLITUS WITHOUT COMPLICATION, WITHOUT LONG-TERM CURRENT USE OF INSULIN (HCC): Primary | ICD-10-CM

## 2022-02-03 DIAGNOSIS — K21.9 GASTROESOPHAGEAL REFLUX DISEASE WITHOUT ESOPHAGITIS: ICD-10-CM

## 2022-02-03 PROCEDURE — 1101F PT FALLS ASSESS-DOCD LE1/YR: CPT | Performed by: INTERNAL MEDICINE

## 2022-02-03 PROCEDURE — G8427 DOCREV CUR MEDS BY ELIG CLIN: HCPCS | Performed by: INTERNAL MEDICINE

## 2022-02-03 PROCEDURE — 99214 OFFICE O/P EST MOD 30 MIN: CPT | Performed by: INTERNAL MEDICINE

## 2022-02-03 PROCEDURE — 2022F DILAT RTA XM EVC RTNOPTHY: CPT | Performed by: INTERNAL MEDICINE

## 2022-02-03 PROCEDURE — 3017F COLORECTAL CA SCREEN DOC REV: CPT | Performed by: INTERNAL MEDICINE

## 2022-02-03 PROCEDURE — G8754 DIAS BP LESS 90: HCPCS | Performed by: INTERNAL MEDICINE

## 2022-02-03 PROCEDURE — G9717 DOC PT DX DEP/BP F/U NT REQ: HCPCS | Performed by: INTERNAL MEDICINE

## 2022-02-03 PROCEDURE — 1090F PRES/ABSN URINE INCON ASSESS: CPT | Performed by: INTERNAL MEDICINE

## 2022-02-03 PROCEDURE — G8536 NO DOC ELDER MAL SCRN: HCPCS | Performed by: INTERNAL MEDICINE

## 2022-02-03 PROCEDURE — 3046F HEMOGLOBIN A1C LEVEL >9.0%: CPT | Performed by: INTERNAL MEDICINE

## 2022-02-03 PROCEDURE — G8417 CALC BMI ABV UP PARAM F/U: HCPCS | Performed by: INTERNAL MEDICINE

## 2022-02-03 PROCEDURE — G8400 PT W/DXA NO RESULTS DOC: HCPCS | Performed by: INTERNAL MEDICINE

## 2022-02-03 PROCEDURE — G8753 SYS BP > OR = 140: HCPCS | Performed by: INTERNAL MEDICINE

## 2022-02-03 RX ORDER — METFORMIN HYDROCHLORIDE 500 MG/1
500 TABLET, EXTENDED RELEASE ORAL 2 TIMES DAILY
Qty: 60 TABLET | Refills: 3 | Status: SHIPPED | OUTPATIENT
Start: 2022-02-03 | End: 2022-04-25 | Stop reason: SDUPTHER

## 2022-02-03 NOTE — PATIENT INSTRUCTIONS
Start metformin once a day for 2 weeks. If no significant side effects, increase to 1 tablet twice a day   Monitor your blood pressure at home 2-3 times a week. If persistently 140/90 or greater (either number), then please let us know.

## 2022-02-03 NOTE — ASSESSMENT & PLAN NOTE
well controlled, continue current medications   A lot better with the pantoprazole   Also started spacing medications out and that's helped with symptoms

## 2022-02-03 NOTE — ASSESSMENT & PLAN NOTE
monitored by specialist. No acute findings meriting change in the plan   Desvenlafaxine was stopped after last visit

## 2022-02-03 NOTE — PROGRESS NOTES
Note   Chief Complaint   Diabetes    Maria Elena Almaguer is a 73 y.o. female     1. Type 2 diabetes mellitus without complication, without long-term current use of insulin ([facility] [address]  Assessment & Plan:  Ozempic stopped 2 episodes attributed to hypoglycemia. These episodes stopped after stopping Ozempic  Last A1c 7%. Has also noted some weight gain  Recommend starting Metformin extended release 500 twice a day. Discussed starting once daily for 2 weeks then increasing to twice a day. Could consider SGLT2 in the future for weight benefits but note she is already on Lasix and hydrochlorothiazide  Orders:  -     metFORMIN ER (GLUCOPHAGE XR) 500 mg tablet; Take 1 Tablet by mouth two (2) times a day. Indications: type 2 diabetes mellitus, Normal, Disp-60 Tablet, R-3  2. Balance problem  Assessment & Plan:  Notes some balance problems when walking. Her mother  after a hip fracture so she is concerned. Recommend fall risk eval with physical therapy. Referral for upstairs provided  Orders:  -     REFERRAL TO PHYSICAL THERAPY  3. Severe obesity (BMI 35.0-35.9 with comorbidity) ([facility] [address]  Assessment & Plan:  Has noted some weight gain since last visit. We stopped Ozempic due to possible hypoglycemic episodes. Discussed lifestyle changes. She has a treadmill that she will try to use more. We also started Metformin  4. Gastroesophageal reflux disease without esophagitis  Assessment & Plan:   well controlled, continue current medications   A lot better with the pantoprazole   Also started spacing medications out and that's helped with symptoms   5. Essential hypertension  Assessment & Plan:  Elevated today. Denies chest pain, shortness of breath, headaches  Continue current medications but advised to monitor at home. If elevated, we discussed switching metoprolol to Coreg  6. Iron deficiency anemia, unspecified iron deficiency anemia type  Assessment & Plan:  She has been taking iron supplements.   Reports that her OB/GYN told her her last colonoscopy was in 2016 and was told to get a 10-year follow-up  Plan to check next visit  7. Episode of recurrent major depressive disorder, unspecified depression episode severity ([address]  Assessment & Plan:   monitored by specialist. No acute findings meriting change in the plan   Desvenlafaxine was stopped after last visit       Benefits, risks, possible drug interactions, and side effects of all new medications were reviewed with the patient. Pt verbalized understanding. Return to clinic: 3 months for diabetes, blood pressure iron deficiency    An electronic signature was used to authenticate this note. Odell Alvarez MD  [facility]  2/3/2022    Future Appointments   Date Time Provider Sebastian Weiner   3/5/8632 15:69 AM Анна Alaniz MD [facility] BS AMB        Objective   Vitals:       Visit Vitals  BP (!) 142/60   Pulse 68   Temp 97.4 °F (36.3 °C) (Temporal)   Resp 14   Ht 5' 2\" (1.575 m)   Wt 193 lb (87.5 kg)   SpO2 98%   BMI 35.30 kg/m²        Physical Exam  Constitutional:       Appearance: Normal appearance. She is not ill-appearing. Cardiovascular:      Rate and Rhythm: Normal rate and regular rhythm. Heart sounds: No murmur heard. No friction rub. No gallop. Pulmonary:      Effort: No respiratory distress. Breath sounds: Normal breath sounds. No wheezing, rhonchi or rales. Neurological:      Mental Status: She is alert. Current Outpatient Medications   Medication Sig    metFORMIN ER (GLUCOPHAGE XR) 500 mg tablet Take 1 Tablet by mouth two (2) times a day. Indications: type 2 diabetes mellitus    pantoprazole (PROTONIX) 40 mg tablet TAKE ONE TABLET BY MOUTH DAILY    furosemide (LASIX) 20 mg tablet TAKE ONE TABLET BY MOUTH DAILY    ferrous sulfate (IRON) 325 mg (65 mg iron) EC tablet Take 1 Tablet by mouth Daily (before breakfast).  Indications: low iron    hydroCHLOROthiazide (HYDRODIURIL) 25 mg tablet Take 1 Tablet by mouth daily.  atorvastatin (LIPITOR) 20 mg tablet TAKE 1 TABLET BY MOUTH EVERY EVENING  Indications: high cholesterol    clobetasoL (TEMOVATE) 0.05 % ointment     EPINEPHrine (EPIPEN) 0.3 mg/0.3 mL injection INJECT INTO OUTER THIGH FOR SEVERE ALLERGIC REACTION. CALL 911 AFTER USE.    FLUoxetine (PROzac) 20 mg capsule Take 60 mg by mouth daily.  flecainide (TAMBOCOR) 50 mg tablet Take 50 mg by mouth two (2) times a day.  metoprolol tartrate (LOPRESSOR) 50 mg tablet 50 mg two (2) times a day.  montelukast (Singulair) 10 mg tablet Take 10 mg by mouth daily.  lamoTRIgine (LaMICtaL) 100 mg tablet Take 150 mg by mouth daily.  cetirizine (ZyrTEC) 10 mg tablet Take 10 mg by mouth daily as needed for Allergies. No current facility-administered medications for this visit.

## 2022-02-03 NOTE — ASSESSMENT & PLAN NOTE
Notes some balance problems when walking. Her mother  after a hip fracture so she is concerned. Recommend fall risk eval with physical therapy.   Referral for upstairs provided

## 2022-02-03 NOTE — ASSESSMENT & PLAN NOTE
Elevated today. Denies chest pain, shortness of breath, headaches  Continue current medications but advised to monitor at home.   If elevated, we discussed switching metoprolol to Coreg

## 2022-02-03 NOTE — ASSESSMENT & PLAN NOTE
Ozempic stopped 2 episodes attributed to hypoglycemia. These episodes stopped after stopping Ozempic  Last A1c 7%. Has also noted some weight gain  Recommend starting Metformin extended release 500 twice a day. Discussed starting once daily for 2 weeks then increasing to twice a day.   Could consider SGLT2 in the future for weight benefits but note she is already on Lasix and hydrochlorothiazide

## 2022-02-03 NOTE — ASSESSMENT & PLAN NOTE
Has noted some weight gain since last visit. We stopped Ozempic due to possible hypoglycemic episodes. Discussed lifestyle changes. She has a treadmill that she will try to use more.   We also started Metformin

## 2022-02-03 NOTE — ASSESSMENT & PLAN NOTE
She has been taking iron supplements.   Reports that her OB/GYN told her her last colonoscopy was in 2016 and was told to get a 10-year follow-up  Plan to check next visit

## 2022-02-04 DIAGNOSIS — K21.9 GASTROESOPHAGEAL REFLUX DISEASE WITHOUT ESOPHAGITIS: ICD-10-CM

## 2022-02-04 RX ORDER — PANTOPRAZOLE SODIUM 40 MG/1
40 TABLET, DELAYED RELEASE ORAL DAILY
Qty: 30 TABLET | Refills: 3 | Status: SHIPPED | OUTPATIENT
Start: 2022-02-04 | End: 2022-05-21

## 2022-02-15 ENCOUNTER — PATIENT MESSAGE (OUTPATIENT)
Dept: INTERNAL MEDICINE CLINIC | Age: 67
End: 2022-02-15

## 2022-02-15 NOTE — TELEPHONE ENCOUNTER
From: Maddie Ball MD  To: Duane Shaunna  Sent: 2/15/2022 8:58 AM EST  Subject: blood presure    Hi,    Thank you for sending your blood pressure readings. Looks like they're still high. During your last visit, we discussed switching your metoprolol to carvedilol which may provide better blood pressure control. Carvedilol is in the same family as metoprolol but works a little better at lowering blood pressure. Are you okay with making this change? The miligram dose of the medication will change because they're not exactly the same, but it'll be the equivalent dose.     Take care,  Dr. Haley Pham

## 2022-02-16 ENCOUNTER — OFFICE VISIT (OUTPATIENT)
Dept: INTERNAL MEDICINE CLINIC | Age: 67
End: 2022-02-16

## 2022-02-16 DIAGNOSIS — Z71.89 ENCOUNTER FOR MEDICATION REVIEW AND COUNSELING: Primary | ICD-10-CM

## 2022-02-16 RX ORDER — DESVENLAFAXINE 25 MG/1
25 TABLET, EXTENDED RELEASE ORAL DAILY
COMMUNITY
End: 2022-06-07

## 2022-02-16 NOTE — PROGRESS NOTES
Mediation Review    S/O: Patient here for a medication review. Wanted to see if any meds were causing her to get dizzy/lightheaded. She is on a variety of BP meds and her HR runs on the lower side. Ran drug interactions on all of her meds and updated her med list.      Current Outpatient Medications:     desvenlafaxine succinate (Pristiq) 25 mg ER tablet, Take 25 mg by mouth daily. , Disp: , Rfl:     pantoprazole (PROTONIX) 40 mg tablet, Take 1 Tablet by mouth daily. , Disp: 30 Tablet, Rfl: 3    metFORMIN ER (GLUCOPHAGE XR) 500 mg tablet, Take 1 Tablet by mouth two (2) times a day. Indications: type 2 diabetes mellitus, Disp: 60 Tablet, Rfl: 3    furosemide (LASIX) 20 mg tablet, TAKE ONE TABLET BY MOUTH DAILY, Disp: 90 Tablet, Rfl: 3    ferrous sulfate (IRON) 325 mg (65 mg iron) EC tablet, Take 1 Tablet by mouth Daily (before breakfast). Indications: low iron, Disp: 90 Tablet, Rfl: 1    hydroCHLOROthiazide (HYDRODIURIL) 25 mg tablet, Take 1 Tablet by mouth daily. , Disp: 90 Tablet, Rfl: 1    atorvastatin (LIPITOR) 20 mg tablet, TAKE 1 TABLET BY MOUTH EVERY EVENING  Indications: high cholesterol, Disp: 90 Tablet, Rfl: 1    clobetasoL (TEMOVATE) 0.05 % ointment, , Disp: , Rfl:     EPINEPHrine (EPIPEN) 0.3 mg/0.3 mL injection, INJECT INTO OUTER THIGH FOR SEVERE ALLERGIC REACTION. CALL 911 AFTER USE., Disp: , Rfl:     FLUoxetine (PROzac) 20 mg capsule, Take 60 mg by mouth daily. , Disp: , Rfl:     flecainide (TAMBOCOR) 50 mg tablet, Take 50 mg by mouth two (2) times a day., Disp: , Rfl:     metoprolol tartrate (LOPRESSOR) 50 mg tablet, 50 mg two (2) times a day., Disp: , Rfl:     montelukast (Singulair) 10 mg tablet, Take 10 mg by mouth daily. , Disp: , Rfl:     lamoTRIgine (LaMICtaL) 100 mg tablet, Take 150 mg by mouth daily. , Disp: , Rfl:     cetirizine (ZyrTEC) 10 mg tablet, Take 10 mg by mouth daily as needed for Allergies.  Only taking prior to allergy shots, Disp: , Rfl:       A/P:   1) Medication Reconciliation: medications reviewed and updated. Drug interactions checked - only thing coming back is to be careful of drugs causing bradycardia with CYP2D6 interaction. Advised patient to check both her blood pressure and heart rate. Sent message to Dr. Ayaka Knott letting her know that patient is okay with BP med change to carvedilol. Baldomero Olguin, PharmD, [facility] [place], [facility] [address] in place:  Yes   Recommendation Provided To: Patient/Caregiver: 0 via In person   Intervention Accepted By: Patient/Caregiver: 0   Time Spent (min): 45

## 2022-02-21 DIAGNOSIS — E78.5 HYPERLIPIDEMIA, UNSPECIFIED HYPERLIPIDEMIA TYPE: ICD-10-CM

## 2022-02-21 RX ORDER — ATORVASTATIN CALCIUM 20 MG/1
TABLET, FILM COATED ORAL
Qty: 90 TABLET | Refills: 1 | Status: SHIPPED | OUTPATIENT
Start: 2022-02-21 | End: 2022-02-23

## 2022-02-23 DIAGNOSIS — E78.5 HYPERLIPIDEMIA, UNSPECIFIED HYPERLIPIDEMIA TYPE: ICD-10-CM

## 2022-02-23 RX ORDER — ATORVASTATIN CALCIUM 20 MG/1
TABLET, FILM COATED ORAL
Qty: 90 TABLET | Refills: 1 | Status: SHIPPED | OUTPATIENT
Start: 2022-02-23

## 2022-03-03 DIAGNOSIS — I10 ESSENTIAL HYPERTENSION: Primary | ICD-10-CM

## 2022-03-03 RX ORDER — CARVEDILOL 12.5 MG/1
12.5 TABLET ORAL 2 TIMES DAILY WITH MEALS
Qty: 60 TABLET | Refills: 1 | Status: SHIPPED | OUTPATIENT
Start: 2022-03-03 | End: 2022-03-17

## 2022-03-17 DIAGNOSIS — I10 ESSENTIAL HYPERTENSION: ICD-10-CM

## 2022-03-17 RX ORDER — CARVEDILOL 25 MG/1
25 TABLET ORAL 2 TIMES DAILY WITH MEALS
Qty: 60 TABLET | Refills: 1 | Status: SHIPPED
Start: 2022-03-17 | End: 2022-04-08

## 2022-03-18 PROBLEM — E66.01 SEVERE OBESITY (BMI 35.0-35.9 WITH COMORBIDITY) (HCC): Status: ACTIVE | Noted: 2022-02-03

## 2022-03-18 PROBLEM — Z00.00 WELCOME TO MEDICARE PREVENTIVE VISIT: Status: ACTIVE | Noted: 2021-05-26

## 2022-03-18 PROBLEM — E11.9 TYPE 2 DIABETES MELLITUS WITHOUT COMPLICATION, WITHOUT LONG-TERM CURRENT USE OF INSULIN (HCC): Status: ACTIVE | Noted: 2021-01-26

## 2022-03-18 PROBLEM — D50.9 IRON DEFICIENCY ANEMIA: Status: ACTIVE | Noted: 2022-02-03

## 2022-03-18 PROBLEM — R26.89 BALANCE PROBLEM: Status: ACTIVE | Noted: 2022-02-03

## 2022-03-18 PROBLEM — K21.9 GASTROESOPHAGEAL REFLUX DISEASE WITHOUT ESOPHAGITIS: Status: ACTIVE | Noted: 2021-09-17

## 2022-03-19 PROBLEM — G47.00 INSOMNIA, UNSPECIFIED TYPE: Status: ACTIVE | Noted: 2021-01-28

## 2022-03-19 PROBLEM — F41.9 ANXIETY: Status: ACTIVE | Noted: 2021-01-28

## 2022-03-19 PROBLEM — E78.5 HYPERLIPIDEMIA, UNSPECIFIED HYPERLIPIDEMIA TYPE: Status: ACTIVE | Noted: 2021-01-26

## 2022-03-19 PROBLEM — F32.9 MAJOR DEPRESSIVE DISORDER: Status: ACTIVE | Noted: 2021-01-28

## 2022-03-19 PROBLEM — M79.602 LEFT ARM PAIN: Status: ACTIVE | Noted: 2021-05-26

## 2022-03-20 PROBLEM — E16.2 HYPOGLYCEMIA: Status: ACTIVE | Noted: 2021-08-10

## 2022-04-08 ENCOUNTER — OFFICE VISIT (OUTPATIENT)
Dept: INTERNAL MEDICINE CLINIC | Age: 67
End: 2022-04-08
Payer: MEDICARE

## 2022-04-08 VITALS
RESPIRATION RATE: 14 BRPM | BODY MASS INDEX: 35.88 KG/M2 | HEIGHT: 62 IN | SYSTOLIC BLOOD PRESSURE: 146 MMHG | WEIGHT: 195 LBS | TEMPERATURE: 97.1 F | HEART RATE: 71 BPM | DIASTOLIC BLOOD PRESSURE: 73 MMHG | OXYGEN SATURATION: 98 %

## 2022-04-08 DIAGNOSIS — E11.9 TYPE 2 DIABETES MELLITUS WITHOUT COMPLICATION, WITHOUT LONG-TERM CURRENT USE OF INSULIN (HCC): ICD-10-CM

## 2022-04-08 DIAGNOSIS — I10 ESSENTIAL HYPERTENSION: Primary | ICD-10-CM

## 2022-04-08 DIAGNOSIS — D50.9 IRON DEFICIENCY ANEMIA, UNSPECIFIED IRON DEFICIENCY ANEMIA TYPE: ICD-10-CM

## 2022-04-08 PROCEDURE — G8417 CALC BMI ABV UP PARAM F/U: HCPCS | Performed by: INTERNAL MEDICINE

## 2022-04-08 PROCEDURE — G8754 DIAS BP LESS 90: HCPCS | Performed by: INTERNAL MEDICINE

## 2022-04-08 PROCEDURE — 1101F PT FALLS ASSESS-DOCD LE1/YR: CPT | Performed by: INTERNAL MEDICINE

## 2022-04-08 PROCEDURE — G8400 PT W/DXA NO RESULTS DOC: HCPCS | Performed by: INTERNAL MEDICINE

## 2022-04-08 PROCEDURE — G8536 NO DOC ELDER MAL SCRN: HCPCS | Performed by: INTERNAL MEDICINE

## 2022-04-08 PROCEDURE — G8427 DOCREV CUR MEDS BY ELIG CLIN: HCPCS | Performed by: INTERNAL MEDICINE

## 2022-04-08 PROCEDURE — 2022F DILAT RTA XM EVC RTNOPTHY: CPT | Performed by: INTERNAL MEDICINE

## 2022-04-08 PROCEDURE — 3017F COLORECTAL CA SCREEN DOC REV: CPT | Performed by: INTERNAL MEDICINE

## 2022-04-08 PROCEDURE — 3046F HEMOGLOBIN A1C LEVEL >9.0%: CPT | Performed by: INTERNAL MEDICINE

## 2022-04-08 PROCEDURE — G9717 DOC PT DX DEP/BP F/U NT REQ: HCPCS | Performed by: INTERNAL MEDICINE

## 2022-04-08 PROCEDURE — 1090F PRES/ABSN URINE INCON ASSESS: CPT | Performed by: INTERNAL MEDICINE

## 2022-04-08 PROCEDURE — G8753 SYS BP > OR = 140: HCPCS | Performed by: INTERNAL MEDICINE

## 2022-04-08 PROCEDURE — 99214 OFFICE O/P EST MOD 30 MIN: CPT | Performed by: INTERNAL MEDICINE

## 2022-04-08 RX ORDER — NEBIVOLOL 10 MG/1
10 TABLET ORAL DAILY
Qty: 30 TABLET | Refills: 1 | Status: SHIPPED | OUTPATIENT
Start: 2022-04-08 | End: 2022-05-26

## 2022-04-08 RX ORDER — VORTIOXETINE 10 MG/1
10 TABLET, FILM COATED ORAL DAILY
COMMUNITY
Start: 2022-03-24

## 2022-04-08 RX ORDER — SPIRONOLACTONE 25 MG/1
25 TABLET ORAL DAILY
Qty: 30 TABLET | Refills: 1 | Status: SHIPPED | OUTPATIENT
Start: 2022-04-08 | End: 2022-05-08 | Stop reason: SDUPTHER

## 2022-04-08 NOTE — ASSESSMENT & PLAN NOTE
After last visit, we switched her metoprolol to Coreg.   Continues to be elevated  Recommend switching Coreg to nebivolol 10 mg  If tolerating switch okay, recommend stopping Lasix and starting spironolactone a week later  Continue hydrochlorothiazide  Check labs in 2 weeks  Can't do acei bc of allergy  amlo ankle swelling

## 2022-04-08 NOTE — ASSESSMENT & PLAN NOTE
Started metformin last visit - taking BID   [facility]   Has seen an eye doctor in the past year  Continue Metformin 500 twice a day.   Check A1c  Ozempic cause hypoglycemia  Consider SGLT2 but already on spironolactone and hydrochlorothiazide

## 2022-04-08 NOTE — PATIENT INSTRUCTIONS
Week one: stop carvedilol, start nebivolol  Week two:   Stop furosemide (lasix), start spironolactone  Continue your other medications

## 2022-04-08 NOTE — PROGRESS NOTES
Note   Chief Complaint   Blood pressure    Ralph Bolanos is a 73 y.o. female     1. Essential hypertension  Assessment & Plan:  After last visit, we switched her metoprolol to Coreg. Continues to be elevated  Recommend switching Coreg to nebivolol 10 mg  If tolerating switch okay, recommend stopping Lasix and starting spironolactone a week later  Continue hydrochlorothiazide  Check labs in 2 weeks  Can't do acei bc of allergy  amlo ankle swelling  Orders:  -     nebivoloL (BYSTOLIC) 10 mg tablet; Take 1 Tablet by mouth daily. Indications: high blood pressure, Normal, Disp-30 Tablet, R-1  -     spironolactone (ALDACTONE) 25 mg tablet; Take 1 Tablet by mouth daily. Indications: high blood pressure, Normal, Disp-30 Tablet, R-1  -     METABOLIC PANEL, BASIC; Future  2. Iron deficiency anemia, unspecified iron deficiency anemia type  Assessment & Plan:  Check with next labs   Orders:  -     IRON PROFILE; Future  -     FERRITIN; Future  -     CBC W/O DIFF; Future  3. Type 2 diabetes mellitus without complication, without long-term current use of insulin ([facility] [address]  Assessment & Plan:  Started metformin last visit - taking BID   [facility] doctor in the past year  Continue Metformin 500 twice a day. Check A1c  Ozempic cause hypoglycemia  Consider SGLT2 but already on spironolactone and hydrochlorothiazide  Orders:  -     HEMOGLOBIN A1C WITH EAG; Future         Benefits, risks, possible drug interactions, and side effects of all new medications were reviewed with the patient. Pt verbalized understanding. 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   Jimmy Edgar MD  [facility]  4/8/2022    Future Appointments   Date Time Provider Sebastian Weiner   5/3/2022 10:45 AM Sonal Cuellar MD [facility] BS AMB   6/63/2526 40:56 AM Sonal Cuellar MD [facility] BS AMB        Objective   Vitals:       Visit Vitals  BP (!) 146/73 (BP 1 Location: Left upper arm, BP Patient Position: Sitting, BP Cuff Size: Adult)   Pulse 71   Temp 97.1 °F (36.2 °C) (Oral)   Resp 14   Ht 5' 2\" (1.575 m)   Wt 195 lb (88.5 kg)   SpO2 98%   BMI 35.67 kg/m²        Physical Exam  Constitutional:       Appearance: Normal appearance. She is not ill-appearing. Cardiovascular:      Rate and Rhythm: Normal rate and regular rhythm. Heart sounds: No murmur heard. No friction rub. No gallop. Pulmonary:      Effort: No respiratory distress. Breath sounds: Normal breath sounds. No wheezing, rhonchi or rales. Neurological:      Mental Status: She is alert. Current Outpatient Medications   Medication Sig    Trintellix 10 mg tablet Take 10 mg by mouth daily.  nebivoloL (BYSTOLIC) 10 mg tablet Take 1 Tablet by mouth daily. Indications: high blood pressure    spironolactone (ALDACTONE) 25 mg tablet Take 1 Tablet by mouth daily. Indications: high blood pressure    atorvastatin (LIPITOR) 20 mg tablet TAKE ONE TABLET BY MOUTH EVERY EVENING    desvenlafaxine succinate (Pristiq) 25 mg ER tablet Take 25 mg by mouth daily.  pantoprazole (PROTONIX) 40 mg tablet Take 1 Tablet by mouth daily.  metFORMIN ER (GLUCOPHAGE XR) 500 mg tablet Take 1 Tablet by mouth two (2) times a day. Indications: type 2 diabetes mellitus    ferrous sulfate (IRON) 325 mg (65 mg iron) EC tablet Take 1 Tablet by mouth Daily (before breakfast). Indications: low iron    hydroCHLOROthiazide (HYDRODIURIL) 25 mg tablet Take 1 Tablet by mouth daily.  clobetasoL (TEMOVATE) 0.05 % ointment     EPINEPHrine (EPIPEN) 0.3 mg/0.3 mL injection INJECT INTO OUTER THIGH FOR SEVERE ALLERGIC REACTION. CALL 911 AFTER USE.  flecainide (TAMBOCOR) 50 mg tablet Take 50 mg by mouth two (2) times a day.     montelukast (Singulair) 10 mg tablet Take 10 mg by mouth daily.  lamoTRIgine (LaMICtaL) 100 mg tablet Take 150 mg by mouth daily.  cetirizine (ZyrTEC) 10 mg tablet Take 10 mg by mouth daily as needed for Allergies. Only taking prior to allergy shots     No current facility-administered medications for this visit.

## 2022-04-25 DIAGNOSIS — E11.9 TYPE 2 DIABETES MELLITUS WITHOUT COMPLICATION, WITHOUT LONG-TERM CURRENT USE OF INSULIN (HCC): ICD-10-CM

## 2022-04-25 RX ORDER — METFORMIN HYDROCHLORIDE 500 MG/1
500 TABLET, EXTENDED RELEASE ORAL 2 TIMES DAILY
Qty: 60 TABLET | Refills: 3 | Status: SHIPPED | OUTPATIENT
Start: 2022-04-25 | End: 2022-05-21

## 2022-05-03 ENCOUNTER — OFFICE VISIT (OUTPATIENT)
Dept: INTERNAL MEDICINE CLINIC | Age: 67
End: 2022-05-03
Payer: MEDICARE

## 2022-05-03 VITALS
SYSTOLIC BLOOD PRESSURE: 145 MMHG | DIASTOLIC BLOOD PRESSURE: 70 MMHG | HEIGHT: 62 IN | HEART RATE: 74 BPM | WEIGHT: 195 LBS | OXYGEN SATURATION: 98 % | BODY MASS INDEX: 35.88 KG/M2 | TEMPERATURE: 97 F | RESPIRATION RATE: 14 BRPM

## 2022-05-03 DIAGNOSIS — M25.561 CHRONIC PAIN OF RIGHT KNEE: ICD-10-CM

## 2022-05-03 DIAGNOSIS — G89.29 CHRONIC PAIN OF RIGHT KNEE: ICD-10-CM

## 2022-05-03 DIAGNOSIS — I10 ESSENTIAL HYPERTENSION: ICD-10-CM

## 2022-05-03 PROCEDURE — 1101F PT FALLS ASSESS-DOCD LE1/YR: CPT | Performed by: INTERNAL MEDICINE

## 2022-05-03 PROCEDURE — G8400 PT W/DXA NO RESULTS DOC: HCPCS | Performed by: INTERNAL MEDICINE

## 2022-05-03 PROCEDURE — 99214 OFFICE O/P EST MOD 30 MIN: CPT | Performed by: INTERNAL MEDICINE

## 2022-05-03 PROCEDURE — G8427 DOCREV CUR MEDS BY ELIG CLIN: HCPCS | Performed by: INTERNAL MEDICINE

## 2022-05-03 PROCEDURE — G8754 DIAS BP LESS 90: HCPCS | Performed by: INTERNAL MEDICINE

## 2022-05-03 PROCEDURE — G8417 CALC BMI ABV UP PARAM F/U: HCPCS | Performed by: INTERNAL MEDICINE

## 2022-05-03 PROCEDURE — G8536 NO DOC ELDER MAL SCRN: HCPCS | Performed by: INTERNAL MEDICINE

## 2022-05-03 PROCEDURE — 3017F COLORECTAL CA SCREEN DOC REV: CPT | Performed by: INTERNAL MEDICINE

## 2022-05-03 PROCEDURE — 1090F PRES/ABSN URINE INCON ASSESS: CPT | Performed by: INTERNAL MEDICINE

## 2022-05-03 PROCEDURE — G8753 SYS BP > OR = 140: HCPCS | Performed by: INTERNAL MEDICINE

## 2022-05-03 PROCEDURE — G9717 DOC PT DX DEP/BP F/U NT REQ: HCPCS | Performed by: INTERNAL MEDICINE

## 2022-05-03 NOTE — PROGRESS NOTES
Note   Chief Complaint   Blood pressure    Jos Vasquez is a 73 y.o. female     1. Essential hypertension  Assessment & Plan:  Last visit switched Coreg to Bystolic and Lasix to spironolactone. Tolerating well. No lightheadedness or dizziness. Blood pressure maybe a little bit better but still poorly controlled  Discussed increasing spironolactone to 50 mg if labs are okay. Continue Bystolic 10 mg, hydrochlorothiazide 25  Consider switching hydrochlorothiazide to chlorthalidone if needed   Previous medications:  Amlodipine (ankle swelling at 2.5mg); Contraindications: ACEi (allergy)  2. Chronic pain of right knee  Assessment & Plan:  Developed right [facility] pain. [facility] which showed arthritis. Recommend formal physical therapy for arthritis. Prescription for pivot provided. Can trial liniment topical       Benefits, risks, possible drug interactions, and side effects of all new medications were reviewed with the patient. Pt verbalized understanding. 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 Jackson Newsome dD  [facility]  5/3/2022    Future Appointments   Date Time Provider Sebastian Weiner   7/0/4586 23:41 AM Luis Babb MD [facility] BS AMB        Objective   Vitals:       Visit Vitals  BP (!) 145/70 (BP 1 Location: Left upper arm, BP Patient Position: Sitting, BP Cuff Size: Adult)   Pulse 74   Temp 97 °F (36.1 °C) (Oral)   Resp 14   Ht 5' 2\" (1.575 m)   Wt 195 lb (88.5 kg)   SpO2 98%   BMI 35.67 kg/m²        Physical Exam  Constitutional:       Appearance: Normal appearance. She is not ill-appearing. Cardiovascular:      Rate and Rhythm: Normal rate and regular rhythm. Heart sounds: No murmur heard. No friction rub. No gallop. Pulmonary:      Effort: No respiratory distress. Breath sounds: Normal breath sounds. No wheezing, rhonchi or rales. Musculoskeletal:      Comments: Normal range of motion of bilateral knees. Crepitus noted. No significant swelling, tenderness, erythema, warmth of the knee   Neurological:      Mental Status: She is alert. Current Outpatient Medications   Medication Sig    metFORMIN ER (GLUCOPHAGE XR) 500 mg tablet Take 1 Tablet by mouth two (2) times a day. Indications: type 2 diabetes mellitus    Trintellix 10 mg tablet Take 10 mg by mouth daily.  nebivoloL (BYSTOLIC) 10 mg tablet Take 1 Tablet by mouth daily. Indications: high blood pressure    spironolactone (ALDACTONE) 25 mg tablet Take 1 Tablet by mouth daily. Indications: high blood pressure    atorvastatin (LIPITOR) 20 mg tablet TAKE ONE TABLET BY MOUTH EVERY EVENING    desvenlafaxine succinate (Pristiq) 25 mg ER tablet Take 25 mg by mouth daily.  pantoprazole (PROTONIX) 40 mg tablet Take 1 Tablet by mouth daily.  ferrous sulfate (IRON) 325 mg (65 mg iron) EC tablet Take 1 Tablet by mouth Daily (before breakfast). Indications: low iron    hydroCHLOROthiazide (HYDRODIURIL) 25 mg tablet Take 1 Tablet by mouth daily.  clobetasoL (TEMOVATE) 0.05 % ointment     EPINEPHrine (EPIPEN) 0.3 mg/0.3 mL injection INJECT INTO OUTER THIGH FOR SEVERE ALLERGIC REACTION. CALL 911 AFTER USE.  flecainide (TAMBOCOR) 50 mg tablet Take 50 mg by mouth two (2) times a day.  montelukast (Singulair) 10 mg tablet Take 10 mg by mouth daily.  lamoTRIgine (LaMICtaL) 100 mg tablet Take 150 mg by mouth daily.  cetirizine (ZyrTEC) 10 mg tablet Take 10 mg by mouth daily as needed for Allergies. Only taking prior to allergy shots     No current facility-administered medications for this visit.

## 2022-05-03 NOTE — PATIENT INSTRUCTIONS
Absorbine Veterinary Liniment Gel Sore Muscle & Joint Pain    Will plan on increasing spironolactone to 50, continue other blood pressure medicines

## 2022-05-03 NOTE — ASSESSMENT & PLAN NOTE
Developed right knee pain. [facility] which showed arthritis. Recommend formal physical therapy for arthritis. Prescription for pivot provided.   Can trial liniment topical

## 2022-05-04 LAB
BUN SERPL-MCNC: 13 MG/DL (ref 8–27)
BUN/CREAT SERPL: 13 (ref 12–28)
CALCIUM SERPL-MCNC: 9.9 MG/DL (ref 8.7–10.3)
CHLORIDE SERPL-SCNC: 98 MMOL/L (ref 96–106)
CO2 SERPL-SCNC: 24 MMOL/L (ref 20–29)
CREAT SERPL-MCNC: 0.99 MG/DL (ref 0.57–1)
EGFR: 63 ML/MIN/1.73
ERYTHROCYTE [DISTWIDTH] IN BLOOD BY AUTOMATED COUNT: 16.3 % (ref 11.7–15.4)
EST. AVERAGE GLUCOSE BLD GHB EST-MCNC: 151 MG/DL
FERRITIN SERPL-MCNC: 30 NG/ML (ref 15–150)
GLUCOSE SERPL-MCNC: 104 MG/DL (ref 65–99)
HBA1C MFR BLD: 6.9 % (ref 4.8–5.6)
HCT VFR BLD AUTO: 34.9 % (ref 34–46.6)
HGB BLD-MCNC: 11.5 G/DL (ref 11.1–15.9)
IRON SATN MFR SERPL: 29 % (ref 15–55)
IRON SERPL-MCNC: 104 UG/DL (ref 27–139)
MCH RBC QN AUTO: 24.9 PG (ref 26.6–33)
MCHC RBC AUTO-ENTMCNC: 33 G/DL (ref 31.5–35.7)
MCV RBC AUTO: 76 FL (ref 79–97)
PLATELET # BLD AUTO: 262 X10E3/UL (ref 150–450)
POTASSIUM SERPL-SCNC: 4.3 MMOL/L (ref 3.5–5.2)
RBC # BLD AUTO: 4.62 X10E6/UL (ref 3.77–5.28)
SODIUM SERPL-SCNC: 137 MMOL/L (ref 134–144)
TIBC SERPL-MCNC: 353 UG/DL (ref 250–450)
UIBC SERPL-MCNC: 249 UG/DL (ref 118–369)
WBC # BLD AUTO: 6.1 X10E3/UL (ref 3.4–10.8)

## 2022-05-08 DIAGNOSIS — I10 ESSENTIAL HYPERTENSION: ICD-10-CM

## 2022-05-08 RX ORDER — SPIRONOLACTONE 50 MG/1
50 TABLET, FILM COATED ORAL DAILY
Qty: 30 TABLET | Refills: 1 | Status: SHIPPED | OUTPATIENT
Start: 2022-05-08 | End: 2022-05-21

## 2022-05-08 NOTE — PROGRESS NOTES
[employer] message sent. Iron saturation normal.  Ferritin 38 from 15. Hemoglobin 11.5. MCV 76. Platelets normal.  Glucose 104.   BMP otherwise normal.  A1c 6.9 from 7    Non fasting  Iron better, continue iron supplements  A1c stable, continue metformin

## 2022-05-21 DIAGNOSIS — I10 ESSENTIAL HYPERTENSION: ICD-10-CM

## 2022-05-21 DIAGNOSIS — K21.9 GASTROESOPHAGEAL REFLUX DISEASE WITHOUT ESOPHAGITIS: ICD-10-CM

## 2022-05-21 DIAGNOSIS — E11.9 TYPE 2 DIABETES MELLITUS WITHOUT COMPLICATION, WITHOUT LONG-TERM CURRENT USE OF INSULIN (HCC): ICD-10-CM

## 2022-05-21 RX ORDER — HYDROCHLOROTHIAZIDE 25 MG/1
25 TABLET ORAL DAILY
Qty: 90 TABLET | Refills: 3 | Status: SHIPPED | OUTPATIENT
Start: 2022-05-21 | End: 2022-07-19

## 2022-05-21 RX ORDER — SPIRONOLACTONE 50 MG/1
50 TABLET, FILM COATED ORAL DAILY
Qty: 90 TABLET | Refills: 1 | Status: SHIPPED | OUTPATIENT
Start: 2022-05-21 | End: 2022-06-08 | Stop reason: SDUPTHER

## 2022-05-21 RX ORDER — PANTOPRAZOLE SODIUM 40 MG/1
40 TABLET, DELAYED RELEASE ORAL DAILY
Qty: 90 TABLET | Refills: 3 | Status: SHIPPED | OUTPATIENT
Start: 2022-05-21

## 2022-05-21 RX ORDER — METFORMIN HYDROCHLORIDE 500 MG/1
TABLET, EXTENDED RELEASE ORAL
Qty: 180 TABLET | Refills: 3 | Status: SHIPPED | OUTPATIENT
Start: 2022-05-21

## 2022-05-25 DIAGNOSIS — I10 ESSENTIAL HYPERTENSION: ICD-10-CM

## 2022-05-25 DIAGNOSIS — E61.1 IRON DEFICIENCY: ICD-10-CM

## 2022-05-26 RX ORDER — NEBIVOLOL 10 MG/1
10 TABLET ORAL DAILY
Qty: 30 TABLET | Refills: 11 | Status: SHIPPED | OUTPATIENT
Start: 2022-05-26 | End: 2022-06-07 | Stop reason: SDUPTHER

## 2022-05-26 RX ORDER — FERROUS SULFATE 325(65) MG
325 TABLET, DELAYED RELEASE (ENTERIC COATED) ORAL
Qty: 30 TABLET | Refills: 11 | Status: SHIPPED | OUTPATIENT
Start: 2022-05-26

## 2022-06-07 ENCOUNTER — OFFICE VISIT (OUTPATIENT)
Dept: INTERNAL MEDICINE CLINIC | Age: 67
End: 2022-06-07
Payer: MEDICARE

## 2022-06-07 DIAGNOSIS — E66.9 CLASS 1 OBESITY WITH SERIOUS COMORBIDITY AND BODY MASS INDEX (BMI) OF 34.0 TO 34.9 IN ADULT, UNSPECIFIED OBESITY TYPE: ICD-10-CM

## 2022-06-07 DIAGNOSIS — I10 ESSENTIAL HYPERTENSION: Primary | ICD-10-CM

## 2022-06-07 DIAGNOSIS — E11.9 TYPE 2 DIABETES MELLITUS WITHOUT COMPLICATION, WITHOUT LONG-TERM CURRENT USE OF INSULIN (HCC): ICD-10-CM

## 2022-06-07 PROCEDURE — 2022F DILAT RTA XM EVC RTNOPTHY: CPT | Performed by: INTERNAL MEDICINE

## 2022-06-07 PROCEDURE — 3044F HG A1C LEVEL LT 7.0%: CPT | Performed by: INTERNAL MEDICINE

## 2022-06-07 PROCEDURE — 1101F PT FALLS ASSESS-DOCD LE1/YR: CPT | Performed by: INTERNAL MEDICINE

## 2022-06-07 PROCEDURE — G8400 PT W/DXA NO RESULTS DOC: HCPCS | Performed by: INTERNAL MEDICINE

## 2022-06-07 PROCEDURE — G8536 NO DOC ELDER MAL SCRN: HCPCS | Performed by: INTERNAL MEDICINE

## 2022-06-07 PROCEDURE — 1090F PRES/ABSN URINE INCON ASSESS: CPT | Performed by: INTERNAL MEDICINE

## 2022-06-07 PROCEDURE — 99214 OFFICE O/P EST MOD 30 MIN: CPT | Performed by: INTERNAL MEDICINE

## 2022-06-07 PROCEDURE — G9717 DOC PT DX DEP/BP F/U NT REQ: HCPCS | Performed by: INTERNAL MEDICINE

## 2022-06-07 PROCEDURE — G8754 DIAS BP LESS 90: HCPCS | Performed by: INTERNAL MEDICINE

## 2022-06-07 PROCEDURE — 3017F COLORECTAL CA SCREEN DOC REV: CPT | Performed by: INTERNAL MEDICINE

## 2022-06-07 PROCEDURE — G8753 SYS BP > OR = 140: HCPCS | Performed by: INTERNAL MEDICINE

## 2022-06-07 PROCEDURE — G8427 DOCREV CUR MEDS BY ELIG CLIN: HCPCS | Performed by: INTERNAL MEDICINE

## 2022-06-07 PROCEDURE — G8417 CALC BMI ABV UP PARAM F/U: HCPCS | Performed by: INTERNAL MEDICINE

## 2022-06-07 RX ORDER — NEBIVOLOL 10 MG/1
10 TABLET ORAL DAILY
Qty: 90 TABLET | Refills: 3 | Status: SHIPPED | OUTPATIENT
Start: 2022-06-07

## 2022-06-07 NOTE — PATIENT INSTRUCTIONS
At least 80g of protein a day and less than 100g carbs a day  loseit  myfitnesspal    Restart metformin - 1 tablet twice a day

## 2022-06-07 NOTE — PROGRESS NOTES
Note   Chief Complaint   bp    Marlin Portillo is a 73 y.o. female     1. Essential hypertension  Assessment & Plan:  Spironolactone increased last visit  Elevated on arrival due to stressful drive here  Better on repeat though still elevated  No SE, lightheadedness, dizziness, CP, SOB  Plan to increase spironolactone to 100 from 50 if labs okay  Cont bystolic 17MS (HR low - likely can't go higher)  hctz 25  Consider switching to chlorthalidone if needed  Previous medications:  Amlodipine (ankle swelling at 2.5mg); coreg (switched to bystolic to see if that worked better), lasix (switched to spironolactone for better bp control)  Contraindications: ACEi (angioedema); ARBs (due to ACEi allergy)  Orders:  -     METABOLIC PANEL, BASIC; Future  -     nebivoloL (BYSTOLIC) 10 mg tablet; Take 1 Tablet by mouth daily. Indications: high blood pressure, Normal, Disp-90 Tablet, R-3  2. Class 1 obesity with serious comorbidity and body mass index (BMI) of 34.0 to 34.9 in adult, unspecified obesity type  Assessment & Plan:   Hard to work out with recent foot issue - had cyst removed form right foot  Sent a [employer] message asking about weight loss medications  Brought up meds, but she would like to work on lifestyle changes  rec tracking calories/macros with apps  Had stopped metformin - rec restarting  80g protein, less than 100g carb daily goal  Previous:  ozempic (hypoglycemia)  Orders:  -     REFERRAL TO NUTRITION  3. Type 2 diabetes mellitus without complication, without long-term current use of insulin (HCC)  Assessment & Plan:  Had stopped metformin because she thought we discussed stopping it  rec restarting metformin  BID  Ozempic cause hypoglycemia  Consider SGLT2 but already on spironolactone and hydrochlorothiazide       Benefits, risks, possible drug interactions, and side effects of all new medications were reviewed with the patient. Pt verbalized understanding.     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 was used to authenticate this note. Debbie Clayton MD  [facility]  6/7/2022    Future Appointments   Date Time Provider Sebastian Weiner   9/05/0687 77:77 AM Javad Bennett MD [facility] BS AMB        Objective   Vitals:       Visit Vitals  BP (!) 173/75 (BP 1 Location: Left lower arm, BP Patient Position: Sitting, BP Cuff Size: Adult)   Pulse 60   Temp 98 °F (36.7 °C) (Oral)   Resp 14   Ht 5' 2\" (1.575 m)   Wt 191 lb (86.6 kg)   SpO2 98%   BMI 34.93 kg/m²        Physical Exam  Constitutional:       Appearance: Normal appearance. She is not ill-appearing. Cardiovascular:      Rate and Rhythm: Normal rate and regular rhythm. Heart sounds: No murmur heard. No friction rub. No gallop. Pulmonary:      Effort: No respiratory distress. Breath sounds: Normal breath sounds. No wheezing, rhonchi or rales. Neurological:      Mental Status: She is alert. Current Outpatient Medications   Medication Sig    nebivoloL (BYSTOLIC) 10 mg tablet Take 1 Tablet by mouth daily. Indications: high blood pressure    ferrous sulfate (IRON) 325 mg (65 mg iron) EC tablet Take 1 Tablet by mouth Daily (before breakfast).  pantoprazole (PROTONIX) 40 mg tablet Take 1 Tablet by mouth daily. Indications: gastroesophageal reflux disease    spironolactone (ALDACTONE) 50 mg tablet Take 1 Tablet by mouth daily. Indications: high blood pressure    metFORMIN ER (GLUCOPHAGE XR) 500 mg tablet TAKE ONE TABLET BY MOUTH TWICE DAILY    hydroCHLOROthiazide (HYDRODIURIL) 25 mg tablet Take 1 Tablet by mouth daily. Indications: high blood pressure    Trintellix 10 mg tablet Take 10 mg by mouth daily.     atorvastatin (LIPITOR) 20 mg tablet TAKE ONE TABLET BY MOUTH EVERY EVENING    clobetasoL (TEMOVATE) 0.05 % ointment     EPINEPHrine (EPIPEN) 0.3 mg/0.3 mL injection INJECT INTO OUTER THIGH FOR SEVERE ALLERGIC REACTION. CALL 911 AFTER USE.  flecainide (TAMBOCOR) 50 mg tablet Take 50 mg by mouth two (2) times a day.  montelukast (Singulair) 10 mg tablet Take 10 mg by mouth daily.  lamoTRIgine (LaMICtaL) 100 mg tablet Take 150 mg by mouth daily.  cetirizine (ZyrTEC) 10 mg tablet Take 10 mg by mouth daily as needed for Allergies. Only taking prior to allergy shots     No current facility-administered medications for this visit.

## 2022-06-08 VITALS
HEIGHT: 62 IN | OXYGEN SATURATION: 98 % | BODY MASS INDEX: 35.15 KG/M2 | RESPIRATION RATE: 14 BRPM | DIASTOLIC BLOOD PRESSURE: 80 MMHG | WEIGHT: 191 LBS | SYSTOLIC BLOOD PRESSURE: 142 MMHG | TEMPERATURE: 98 F | HEART RATE: 60 BPM

## 2022-06-08 DIAGNOSIS — I10 ESSENTIAL HYPERTENSION: ICD-10-CM

## 2022-06-08 LAB
ANION GAP SERPL CALC-SCNC: 8 MMOL/L (ref 5–15)
BUN SERPL-MCNC: 11 MG/DL (ref 6–20)
BUN/CREAT SERPL: 9 (ref 12–20)
CALCIUM SERPL-MCNC: 9.6 MG/DL (ref 8.5–10.1)
CHLORIDE SERPL-SCNC: 103 MMOL/L (ref 97–108)
CO2 SERPL-SCNC: 28 MMOL/L (ref 21–32)
CREAT SERPL-MCNC: 1.25 MG/DL (ref 0.55–1.02)
GLUCOSE SERPL-MCNC: 152 MG/DL (ref 65–100)
POTASSIUM SERPL-SCNC: 3.9 MMOL/L (ref 3.5–5.1)
SODIUM SERPL-SCNC: 139 MMOL/L (ref 136–145)

## 2022-06-08 RX ORDER — SPIRONOLACTONE 100 MG/1
100 TABLET, FILM COATED ORAL DAILY
Qty: 90 TABLET | Refills: 1 | Status: SHIPPED | OUTPATIENT
Start: 2022-06-08 | End: 2022-09-23 | Stop reason: SDUPTHER

## 2022-06-08 NOTE — ASSESSMENT & PLAN NOTE
Had stopped metformin because she thought we discussed stopping it  rec restarting metformin  BID  Ozempic cause hypoglycemia  Consider SGLT2 but already on spironolactone and hydrochlorothiazide

## 2022-06-08 NOTE — ASSESSMENT & PLAN NOTE
Spironolactone increased last visit  Elevated on arrival due to stressful drive here  Better on repeat though still elevated  No SE, lightheadedness, dizziness, CP, SOB  Plan to increase spironolactone to 100 from 50 if labs okay  Cont bystolic 05HY (HR low - likely can't go higher)  hctz 25  Consider switching to chlorthalidone if needed  Previous medications:  Amlodipine (ankle swelling at 2.5mg); coreg (switched to bystolic to see if that worked better), lasix (switched to spironolactone for better bp control)  Contraindications: ACEi (angioedema);  ARBs (due to ACEi allergy)

## 2022-06-08 NOTE — ASSESSMENT & PLAN NOTE
Hard to work out with recent foot issue - had cyst removed form right foot  Sent a [employer] message asking about weight loss medications  Brought up meds, but she would like to work on lifestyle changes  rec tracking calories/macros with apps  Had stopped metformin - rec restarting  80g protein, less than 100g carb daily goal  Previous:  ozempic (hypoglycemia)

## 2022-07-08 ENCOUNTER — APPOINTMENT (OUTPATIENT)
Dept: NUTRITION | Age: 67
End: 2022-07-08

## 2022-07-19 ENCOUNTER — OFFICE VISIT (OUTPATIENT)
Dept: INTERNAL MEDICINE CLINIC | Age: 67
End: 2022-07-19
Payer: MEDICARE

## 2022-07-19 VITALS
HEIGHT: 62 IN | SYSTOLIC BLOOD PRESSURE: 145 MMHG | WEIGHT: 191 LBS | DIASTOLIC BLOOD PRESSURE: 85 MMHG | BODY MASS INDEX: 35.15 KG/M2 | HEART RATE: 74 BPM | OXYGEN SATURATION: 97 % | TEMPERATURE: 97.9 F | RESPIRATION RATE: 14 BRPM

## 2022-07-19 DIAGNOSIS — I10 ESSENTIAL HYPERTENSION: Primary | ICD-10-CM

## 2022-07-19 PROCEDURE — G8754 DIAS BP LESS 90: HCPCS | Performed by: INTERNAL MEDICINE

## 2022-07-19 PROCEDURE — 99214 OFFICE O/P EST MOD 30 MIN: CPT | Performed by: INTERNAL MEDICINE

## 2022-07-19 PROCEDURE — G9717 DOC PT DX DEP/BP F/U NT REQ: HCPCS | Performed by: INTERNAL MEDICINE

## 2022-07-19 PROCEDURE — G8427 DOCREV CUR MEDS BY ELIG CLIN: HCPCS | Performed by: INTERNAL MEDICINE

## 2022-07-19 PROCEDURE — 1101F PT FALLS ASSESS-DOCD LE1/YR: CPT | Performed by: INTERNAL MEDICINE

## 2022-07-19 PROCEDURE — G8400 PT W/DXA NO RESULTS DOC: HCPCS | Performed by: INTERNAL MEDICINE

## 2022-07-19 PROCEDURE — G8753 SYS BP > OR = 140: HCPCS | Performed by: INTERNAL MEDICINE

## 2022-07-19 PROCEDURE — 3017F COLORECTAL CA SCREEN DOC REV: CPT | Performed by: INTERNAL MEDICINE

## 2022-07-19 PROCEDURE — G8417 CALC BMI ABV UP PARAM F/U: HCPCS | Performed by: INTERNAL MEDICINE

## 2022-07-19 PROCEDURE — 1090F PRES/ABSN URINE INCON ASSESS: CPT | Performed by: INTERNAL MEDICINE

## 2022-07-19 PROCEDURE — G8536 NO DOC ELDER MAL SCRN: HCPCS | Performed by: INTERNAL MEDICINE

## 2022-07-19 RX ORDER — CHLORTHALIDONE 50 MG/1
50 TABLET ORAL DAILY
Qty: 30 TABLET | Refills: 1 | Status: SHIPPED | OUTPATIENT
Start: 2022-07-19 | End: 2022-08-16 | Stop reason: SDUPTHER

## 2022-07-19 NOTE — PATIENT INSTRUCTIONS
For your blood pressure - stop hydrochlorothiazide, start chlorthalidone.  Keep taking spironolactone 100mg and nebivolol (bystolic) 56RG daily   Schedule appt for ultrasound

## 2022-07-19 NOTE — ASSESSMENT & PLAN NOTE
Spironolactone increased to 100 last visit  Still elevated. A lot of stress with her kids,   On review of blood pressure history, minimal change with all the blood pressure medication changes we have been making  150-160s at home  Concern for resistant hypertension. Recommend Doppler to evaluate further  In the meantime, recommend switching hydrochlorothiazide to chlorthalidone 50  Continue spironolactone 861, Bystolic 10 (highest dose tolerated, no heart rate room to go up)  Previous medications:  Amlodipine (ankle swelling at 2.5mg); coreg (switched to bystolic to see if that worked better), lasix (switched to spironolactone for better bp control)  Contraindications: ACEi (angioedema);  ARBs (due to ACEi allergy)

## 2022-07-19 NOTE — PROGRESS NOTES
Note   Chief Complaint   Blood pressure    Enrique Marie is a 73 y.o. female     1. Essential hypertension  Assessment & Plan:  Spironolactone increased to 100 last visit  Still elevated. A lot of stress with her kids,   On review of blood pressure history, minimal change with all the blood pressure medication changes we have been making  150-160s at home  Concern for resistant hypertension. Recommend Doppler to evaluate further  In the meantime, recommend switching hydrochlorothiazide to chlorthalidone 50  Continue spironolactone 153, Bystolic 10 (highest dose tolerated, no heart rate room to go up)  Previous medications:  Amlodipine (ankle swelling at 2.5mg); coreg (switched to bystolic to see if that worked better), lasix (switched to spironolactone for better bp control)  Contraindications: ACEi (angioedema); ARBs (due to ACEi allergy)  Orders:  -     chlorthalidone (HYGROTEN) 50 mg tablet; Take 1 Tablet by mouth daily. Indications: high blood pressure, Normal, Disp-30 Tablet, R-1  -     DUPLEX RENAL ART/DAVID BILATERAL; Future       Benefits, risks, possible drug interactions, and side effects of all new medications were reviewed with the patient. Pt verbalized understanding. Return to clinic: 2 months for blood pressure, diabetes, cholesterol    An electronic signature was used to authenticate this note. Amanda Olivo MD  [facility]  7/19/2022    Future Appointments   Date Time Provider Sebastian Regine   8/5/2022  9:00 AM Sisi Flowers RD [facility]   4/12/9552 85:28 AM Yue He MD [facility] BS AMB        Objective   Vitals:       Visit Vitals  BP (!) 145/85   Pulse 74   Temp 97.9 °F (36.6 °C) (Oral)   Resp 14   Ht 5' 2\" (1.575 m)   Wt 191 lb (86.6 kg)   SpO2 97%   BMI 34.93 kg/m²        Physical Exam  Constitutional:       Appearance: Normal appearance. She is not ill-appearing.    Cardiovascular:      Rate and Rhythm: Normal rate and regular rhythm. Heart sounds: No murmur heard. No friction rub. No gallop. Pulmonary:      Effort: No respiratory distress. Breath sounds: Normal breath sounds. No wheezing, rhonchi or rales. Neurological:      Mental Status: She is alert. Current Outpatient Medications   Medication Sig    chlorthalidone (HYGROTEN) 50 mg tablet Take 1 Tablet by mouth daily. Indications: high blood pressure    spironolactone (ALDACTONE) 100 mg tablet Take 1 Tablet by mouth daily. Indications: high blood pressure    nebivoloL (BYSTOLIC) 10 mg tablet Take 1 Tablet by mouth daily. Indications: high blood pressure    ferrous sulfate (IRON) 325 mg (65 mg iron) EC tablet Take 1 Tablet by mouth Daily (before breakfast).  pantoprazole (PROTONIX) 40 mg tablet Take 1 Tablet by mouth daily. Indications: gastroesophageal reflux disease    metFORMIN ER (GLUCOPHAGE XR) 500 mg tablet TAKE ONE TABLET BY MOUTH TWICE DAILY    Trintellix 10 mg tablet Take 10 mg by mouth daily.  atorvastatin (LIPITOR) 20 mg tablet TAKE ONE TABLET BY MOUTH EVERY EVENING    clobetasoL (TEMOVATE) 0.05 % ointment     EPINEPHrine (EPIPEN) 0.3 mg/0.3 mL injection INJECT INTO OUTER THIGH FOR SEVERE ALLERGIC REACTION. CALL 911 AFTER USE.  flecainide (TAMBOCOR) 50 mg tablet Take 50 mg by mouth two (2) times a day.  montelukast (Singulair) 10 mg tablet Take 10 mg by mouth daily.  lamoTRIgine (LaMICtaL) 100 mg tablet Take 150 mg by mouth daily.  cetirizine (ZyrTEC) 10 mg tablet Take 10 mg by mouth daily as needed for Allergies. Only taking prior to allergy shots     No current facility-administered medications for this visit.

## 2022-08-04 ENCOUNTER — HOSPITAL ENCOUNTER (OUTPATIENT)
Dept: VASCULAR SURGERY | Age: 67
Discharge: HOME | End: 2022-08-04
Attending: INTERNAL MEDICINE
Payer: MEDICARE

## 2022-08-04 VITALS — DIASTOLIC BLOOD PRESSURE: 59 MMHG | SYSTOLIC BLOOD PRESSURE: 111 MMHG

## 2022-08-04 DIAGNOSIS — I10 ESSENTIAL HYPERTENSION: ICD-10-CM

## 2022-08-04 PROCEDURE — 93975 VASCULAR STUDY: CPT

## 2022-08-05 LAB
ABDOMINAL PROX AORTA VEL: 179.2 CM/S
CELIAC PSV: 249.6 CM/S
LEFT KIDNEY LENGTH: 11.74 CM
LEFT KIDNEY WIDTH: 4.8 CM
LEFT RENAL DIST DIAS: 10.5 CM/S
LEFT RENAL DIST RAR: 0.38
LEFT RENAL DIST RI: 0.84
LEFT RENAL DIST SYS: 67.6 CM/S
LEFT RENAL LOWER PARENCHYMA MAX: 74.7 CM/S
LEFT RENAL LOWER PARENCHYMA MIN: 21.7 CM/S
LEFT RENAL LOWER PARENCHYMA RI: 0.71
LEFT RENAL MID DIAS: 14.3 CM/S
LEFT RENAL MID RAR: 0.68
LEFT RENAL MID RI: 0.88
LEFT RENAL MID SYS: 121.9 CM/S
LEFT RENAL MIDDLE PARENCHYMA MAX: 52 CM/S
LEFT RENAL MIDDLE PARENCHYMA MIN: 13.6 CM/S
LEFT RENAL MIDDLE PARENCHYMA RI: 0.74
LEFT RENAL PROX DIAS: 17.1 CM/S
LEFT RENAL PROX RAR: 0.63
LEFT RENAL PROX RI: 0.85
LEFT RENAL PROX SYS: 112.6 CM/S
LEFT RENAL UPPER PARENCHYMA MAX: 46.5 CM/S
LEFT RENAL UPPER PARENCHYMA MIN: 11.8 CM/S
LEFT RENAL UPPER PARENCHYMA RI: 0.75
PROX AORTA LONG DIAM: 1.69 CM
PROX AORTIC AP: 1.71 CM
PROX AORTIC TRANS: 2.11 CM
PROX SMA PSV: 276.7 CM/S
RIGHT KIDNEY LENGTH: 9.68 CM
RIGHT KIDNEY WIDTH: 3.91 CM
RIGHT RENAL DIST DIAS: 19.9 CM/S
RIGHT RENAL DIST RAR: 0.54
RIGHT RENAL DIST RI: 0.79
RIGHT RENAL DIST SYS: 96.6 CM/S
RIGHT RENAL LOWER PARENCHYMA MAX: 55.9 CM/S
RIGHT RENAL LOWER PARENCHYMA MIN: 14.2 CM/S
RIGHT RENAL LOWER PARENCHYMA RI: 0.75
RIGHT RENAL MIDDLE PARENCHYMA MAX: 42.3 CM/S
RIGHT RENAL MIDDLE PARENCHYMA MIN: 8.7 CM/S
RIGHT RENAL MIDDLE PARENCHYMA RI: 0.79
RIGHT RENAL UPPER PARENCHYMA MAX: 74.6 CM/S
RIGHT RENAL UPPER PARENCHYMA MIN: 18.6 CM/S
RIGHT RENAL UPPER PARENCHYMA RI: 0.75

## 2022-08-16 DIAGNOSIS — I10 ESSENTIAL HYPERTENSION: ICD-10-CM

## 2022-08-19 RX ORDER — CHLORTHALIDONE 50 MG/1
50 TABLET ORAL DAILY
Qty: 30 TABLET | Refills: 1 | Status: SHIPPED | OUTPATIENT
Start: 2022-08-19 | End: 2022-09-20

## 2022-08-24 DIAGNOSIS — I10 ESSENTIAL HYPERTENSION: ICD-10-CM

## 2022-08-25 LAB
ANION GAP SERPL CALC-SCNC: 6 MMOL/L (ref 5–15)
BUN SERPL-MCNC: 22 MG/DL (ref 6–20)
BUN/CREAT SERPL: 17 (ref 12–20)
CALCIUM SERPL-MCNC: 10.2 MG/DL (ref 8.5–10.1)
CHLORIDE SERPL-SCNC: 102 MMOL/L (ref 97–108)
CO2 SERPL-SCNC: 30 MMOL/L (ref 21–32)
CREAT SERPL-MCNC: 1.26 MG/DL (ref 0.55–1.02)
GLUCOSE SERPL-MCNC: 179 MG/DL (ref 65–100)
POTASSIUM SERPL-SCNC: 4.3 MMOL/L (ref 3.5–5.1)
SODIUM SERPL-SCNC: 138 MMOL/L (ref 136–145)

## 2022-09-20 ENCOUNTER — HOSPITAL ENCOUNTER (OUTPATIENT)
Dept: NUTRITION | Age: 67
Discharge: HOME | End: 2022-09-20
Payer: MEDICARE

## 2022-09-20 ENCOUNTER — OFFICE VISIT (OUTPATIENT)
Dept: INTERNAL MEDICINE CLINIC | Age: 67
End: 2022-09-20
Payer: MEDICARE

## 2022-09-20 VITALS
WEIGHT: 187.4 LBS | HEIGHT: 62 IN | OXYGEN SATURATION: 94 % | TEMPERATURE: 97.8 F | BODY MASS INDEX: 34.48 KG/M2 | RESPIRATION RATE: 14 BRPM | HEART RATE: 64 BPM | DIASTOLIC BLOOD PRESSURE: 80 MMHG | SYSTOLIC BLOOD PRESSURE: 149 MMHG

## 2022-09-20 DIAGNOSIS — E11.9 TYPE 2 DIABETES MELLITUS WITHOUT COMPLICATION, WITHOUT LONG-TERM CURRENT USE OF INSULIN (HCC): ICD-10-CM

## 2022-09-20 DIAGNOSIS — I10 ESSENTIAL HYPERTENSION: ICD-10-CM

## 2022-09-20 DIAGNOSIS — R13.10 DYSPHAGIA, UNSPECIFIED TYPE: Primary | ICD-10-CM

## 2022-09-20 DIAGNOSIS — E11.9 TYPE 2 DIABETES MELLITUS WITHOUT COMPLICATION, WITHOUT LONG-TERM CURRENT USE OF INSULIN (HCC): Primary | ICD-10-CM

## 2022-09-20 DIAGNOSIS — E78.5 HYPERLIPIDEMIA, UNSPECIFIED HYPERLIPIDEMIA TYPE: ICD-10-CM

## 2022-09-20 PROCEDURE — G8754 DIAS BP LESS 90: HCPCS | Performed by: INTERNAL MEDICINE

## 2022-09-20 PROCEDURE — G8536 NO DOC ELDER MAL SCRN: HCPCS | Performed by: INTERNAL MEDICINE

## 2022-09-20 PROCEDURE — 1090F PRES/ABSN URINE INCON ASSESS: CPT | Performed by: INTERNAL MEDICINE

## 2022-09-20 PROCEDURE — 99214 OFFICE O/P EST MOD 30 MIN: CPT | Performed by: INTERNAL MEDICINE

## 2022-09-20 PROCEDURE — G8417 CALC BMI ABV UP PARAM F/U: HCPCS | Performed by: INTERNAL MEDICINE

## 2022-09-20 PROCEDURE — 3017F COLORECTAL CA SCREEN DOC REV: CPT | Performed by: INTERNAL MEDICINE

## 2022-09-20 PROCEDURE — G8400 PT W/DXA NO RESULTS DOC: HCPCS | Performed by: INTERNAL MEDICINE

## 2022-09-20 PROCEDURE — 97803 MED NUTRITION INDIV SUBSEQ: CPT | Performed by: DIETITIAN, REGISTERED

## 2022-09-20 PROCEDURE — G8427 DOCREV CUR MEDS BY ELIG CLIN: HCPCS | Performed by: INTERNAL MEDICINE

## 2022-09-20 PROCEDURE — 1101F PT FALLS ASSESS-DOCD LE1/YR: CPT | Performed by: INTERNAL MEDICINE

## 2022-09-20 PROCEDURE — 2022F DILAT RTA XM EVC RTNOPTHY: CPT | Performed by: INTERNAL MEDICINE

## 2022-09-20 PROCEDURE — G9717 DOC PT DX DEP/BP F/U NT REQ: HCPCS | Performed by: INTERNAL MEDICINE

## 2022-09-20 PROCEDURE — G8753 SYS BP > OR = 140: HCPCS | Performed by: INTERNAL MEDICINE

## 2022-09-20 PROCEDURE — 3044F HG A1C LEVEL LT 7.0%: CPT | Performed by: INTERNAL MEDICINE

## 2022-09-20 RX ORDER — HYDROCHLOROTHIAZIDE 25 MG/1
25 TABLET ORAL DAILY
COMMUNITY
End: 2022-10-20

## 2022-09-20 NOTE — PROGRESS NOTES
NUTRITION - FOLLOW-UP TREATMENT NOTE  Patient Name: Jared Parson         Date: 2022  : 1955    YES Patient  Verified  Diagnosis:   E11.9 (ICD-10-CM) - Type 2 diabetes mellitus without complications   N87.5 (RCL-66-AA) - Obesity, unspecified      In time:   145pm             Out time:   215pm   Total Treatment Time (min):   30     SUBJECTIVE/ASSESSMENT  Current Wt: 187 Previous Wt: 191 Wt Change: -4     Initial Wt: 191 Total Wt change: -4 Height: 62     Changes in medication or medical history? Any new allergies, surgeries or procedures? NO    If yes, update Summary List        Nutrition Diagnosis        Diagnosis Status: Obesity R/T excessive caloric intake, lack of activity AEB pt with BMI >30; pt states somewhat sedentary   [x]  Improved []  No Change    []  Declined   []  Discontinued     Food and nutrition related knowledge deficit R/T lack of prior education for diabetes and nutrition AEB pt questions during session  [x]  Improved []  No Change    []  Declined   []  Discontinued        Nutrition Monitoring and Evaluation: Pt seen today for follow up visit. Pt has lost 4# in the past month. Pt noted that she had decreased appetite leading to under eating. Pt was tracking calories in the first few weeks but was never hitting 1400 calorie goal.   Pt has been trying to keep small snacks around including cheerios, granola bar, nuts, and fruit. Pt notices that she is not able to eat more than small amounts at a time. We discussed importance of getting in enough calories and pt does enjoy Boost Nutrition drinks. Pt has joined [employer] and has gotten in 2 workouts so far. Pt is hoping to get in 2x week exercise at [facility]. Pt was going to use treadmill but home repairs have made that more challenging. Pt is planning to see gastroenterology in the next month. Previous goals:  Improve consistency of CHO intake w/goal to plan meals with 30-45 gm CHO/meal and 1-2 optional snack of 15-20 gm.   - Improve physical activity w/goal to walk on treadmill for 30 minutes 5 times per week, get new activity tracker. - Increase accountability and awareness of food choices by recording food and beverage intake by using a food journal at least 3 days per week     Nutrition Prescription and  Intervention Educated pt on the pathophysiology of Type II Diabetes, insulin resistance and the rationale for dietary modifications and increased activity. Educated pt on lean proteins, healthy fats, non-starchy vegetables, and complex carbohydrate food sources. Discussed limiting carbohydrates, label reading, meal timing, and appropriate serving sizes. Encouraged pt to avoid sugary beverages. Reviewed meal builder tool, calorie and macro breakdown as well as exercise parameters     Patient Education:  [x]  Review current plan with patient   []  Other:    Handouts/  Information Provided: []  Carbohydrates  []  Protein  []  Fiber  []  Serving Sizes  []  Fluids  []  General guidelines []  Diabetes  []  Cholesterol  []  Sodium  []  SBGM  []  Food Journals  []  Others:      Patient Goals - Have at least 2 Boost Supplement drinks per day   - Look to include 2-3 small snacks per day (protein and carb combination)   - Get in a minimum of 2x exercise per week.       PLAN  [x]  Continue on current plan []  Follow-up PRN   []  Discharge due to :    [x]  Next appt: 2-4 weeks      Dietitian: Nithin Barrera RDN    Date: 9/20/2022 Time: 145PM

## 2022-09-20 NOTE — PROGRESS NOTES
Note   Chief Complaint   Follow-up    Bc Bonilla is a 78 y.o. female     1. Dysphagia, unspecified type  Assessment & Plan:  2 weeks, no appetite  Chronic problem with swallowing ongoing for a few years - sometimes feels like doesn't go down and \"feels like drowning\"  Feels like has something stuck upper chest   Tried tums    Takes pantoprazole 40  More so with liquids compared to solids   Feels like has episodes where she stops breathing and jerks when she notices again - ongoing for a while but seems to be increasing in frequency   No fevers, chills, night sweats  No tobacco history   No abd pain, nausea, vomiting, diarrhea, constipation   Doesn't feel sick   No changes to stress   Unclear etiology of symptoms. Recommend GI referral for possible EGD for dysphagia  Orders:  -     REFERRAL TO GASTROENTEROLOGY  2. Hyperlipidemia, unspecified hyperlipidemia type  Assessment & Plan:   unclear control, continue current medications pending work up below   Atorvastatin 20  Orders:  -     LIPID PANEL; Future  3. Type 2 diabetes mellitus without complication, without long-term current use of insulin ([facility] [address]  Assessment & Plan:  Patient reports she never stopped metformin in the past  Check A1c today. Continue metformin  twice a day  Ozempic cause hypoglycemia  Consider SGLT2 but already on spironolactone and hydrochlorothiazide  Orders:  -     HEMOGLOBIN A1C WITH EAG; Future  -     MICROALBUMIN, UR, RAND W/ MICROALB/CREAT RATIO; Future  4. Essential hypertension  Assessment & Plan:   Hasn't been measuring BP at home   Renal Doppler done last visit. Unable to visualize right renal artery, no issues with left renal artery  Ultrasound showing intrinsic renal disease  Hydrochlorothiazide switched to chlorthalidone last visit with minimal change  Pending nephrology appointment in November. Recommend switching back to hydrochlorothiazide 25.   Continue spironolactone 225, Bystolic 10 (highest dose tolerated, no heart rate to go up)  Previous medications:  Amlodipine (ankle swelling at 2.5mg); coreg (switched to bystolic to see if that worked better), lasix (switched to spironolactone for better bp control)  Contraindications: ACEi (angioedema); ARBs (due to ACEi allergy)     Benefits, risks, possible drug interactions, and side effects of all new medications were reviewed with the patient. Pt verbalized understanding. Return to clinic: 2 months for dysphagia, diabetes, high blood pressure    An electronic signature was used to authenticate this note. Isabel Bautista MD  [facility]  9/20/2022    Future Appointments   Date Time Provider Sebastian Weiner   10/18/2022  1:30 PM Ino Arellano RD [facility] [place]   71/94/1857 68:23 AM Linda Jaramillo MD [facility] BS AMB        Objective   Vitals:       Visit Vitals  BP (!) 149/80 (BP 1 Location: Left upper arm, BP Patient Position: Sitting, BP Cuff Size: Small adult)   Pulse 64   Temp 97.8 °F (36.6 °C) (Oral)   Resp 14   Ht 5' 2\" (1.575 m)   Wt 187 lb 6.4 oz (85 kg)   SpO2 94%   BMI 34.28 kg/m²        Physical Exam  Constitutional:       Appearance: Normal appearance. She is not ill-appearing. Cardiovascular:      Rate and Rhythm: Normal rate and regular rhythm. Heart sounds: No murmur heard. No friction rub. No gallop. Pulmonary:      Effort: No respiratory distress. Breath sounds: Normal breath sounds. No wheezing, rhonchi or rales. Neurological:      Mental Status: She is alert. Current Outpatient Medications   Medication Sig    hydroCHLOROthiazide (HYDRODIURIL) 25 mg tablet Take 25 mg by mouth daily. spironolactone (ALDACTONE) 100 mg tablet Take 1 Tablet by mouth daily. Indications: high blood pressure    nebivoloL (BYSTOLIC) 10 mg tablet Take 1 Tablet by mouth daily.  Indications: high blood pressure    ferrous sulfate (IRON) 325 mg (65 mg iron) EC tablet Take 1 Tablet by mouth Daily (before breakfast). pantoprazole (PROTONIX) 40 mg tablet Take 1 Tablet by mouth daily. Indications: gastroesophageal reflux disease    metFORMIN ER (GLUCOPHAGE XR) 500 mg tablet TAKE ONE TABLET BY MOUTH TWICE DAILY    Trintellix 10 mg tablet Take 10 mg by mouth daily. atorvastatin (LIPITOR) 20 mg tablet TAKE ONE TABLET BY MOUTH EVERY EVENING    EPINEPHrine (EPIPEN) 0.3 mg/0.3 mL injection INJECT INTO OUTER THIGH FOR SEVERE ALLERGIC REACTION. CALL 911 AFTER USE.    flecainide (TAMBOCOR) 50 mg tablet Take 50 mg by mouth two (2) times a day. montelukast (SINGULAIR) 10 mg tablet Take 10 mg by mouth daily. cetirizine (ZYRTEC) 10 mg tablet Take 10 mg by mouth daily as needed for Allergies. Only taking prior to allergy shots    clobetasoL (TEMOVATE) 0.05 % ointment  (Patient not taking: Reported on 9/20/2022)    lamoTRIgine (LaMICtal) 100 mg tablet Take 150 mg by mouth daily. No current facility-administered medications for this visit.

## 2022-09-20 NOTE — PATIENT INSTRUCTIONS
Send me a message when you get home - are you taking metformin? Did you ever stop taking it?     Stop chlorthalidone and go back to hydrochlorothiazide 25mg daily

## 2022-09-21 NOTE — ASSESSMENT & PLAN NOTE
2 weeks, no appetite  Chronic problem with swallowing ongoing for a few years - sometimes feels like doesn't go down and \"feels like drowning\"  Feels like has something stuck upper chest   Tried tums    Takes pantoprazole 40  More so with liquids compared to solids   Feels like has episodes where she stops breathing and jerks when she notices again - ongoing for a while but seems to be increasing in frequency   No fevers, chills, night sweats  No tobacco history   No abd pain, nausea, vomiting, diarrhea, constipation   Doesn't feel sick   No changes to stress   Unclear etiology of symptoms.   Recommend GI referral for possible EGD for dysphagia

## 2022-09-21 NOTE — ASSESSMENT & PLAN NOTE
Patient reports she never stopped metformin in the past  Check A1c today.   Continue metformin  twice a day  Ozempic cause hypoglycemia  Consider SGLT2 but already on spironolactone and hydrochlorothiazide

## 2022-09-21 NOTE — ASSESSMENT & PLAN NOTE
Hasn't been measuring BP at home   Renal Doppler done last visit. Unable to visualize right renal artery, no issues with left renal artery  Ultrasound showing intrinsic renal disease  Hydrochlorothiazide switched to chlorthalidone last visit with minimal change  Pending nephrology appointment in November. Recommend switching back to hydrochlorothiazide 25. Continue spironolactone 053, Bystolic 10 (highest dose tolerated, no heart rate to go up)  Previous medications:  Amlodipine (ankle swelling at 2.5mg); coreg (switched to bystolic to see if that worked better), lasix (switched to spironolactone for better bp control)  Contraindications: ACEi (angioedema);  ARBs (due to ACEi allergy)

## 2022-09-23 DIAGNOSIS — I10 ESSENTIAL HYPERTENSION: ICD-10-CM

## 2022-09-23 RX ORDER — SPIRONOLACTONE 100 MG/1
100 TABLET, FILM COATED ORAL DAILY
Qty: 90 TABLET | Refills: 1 | Status: SHIPPED | OUTPATIENT
Start: 2022-09-23

## 2022-10-18 ENCOUNTER — HOSPITAL ENCOUNTER (OUTPATIENT)
Dept: NUTRITION | Age: 67
Discharge: HOME | End: 2022-10-18
Payer: MEDICARE

## 2022-10-18 DIAGNOSIS — E11.9 TYPE 2 DIABETES MELLITUS WITHOUT COMPLICATION, WITHOUT LONG-TERM CURRENT USE OF INSULIN (HCC): Primary | ICD-10-CM

## 2022-10-18 DIAGNOSIS — E66.01 SEVERE OBESITY (BMI 35.0-35.9 WITH COMORBIDITY) (HCC): ICD-10-CM

## 2022-10-18 PROCEDURE — 97803 MED NUTRITION INDIV SUBSEQ: CPT | Performed by: DIETITIAN, REGISTERED

## 2022-10-18 NOTE — PROGRESS NOTES
NUTRITION - FOLLOW-UP TREATMENT NOTE  Patient Name: Poli Bergman         Date: 10/18/2022  : 1955    YES Patient  Verified  Diagnosis:   E11.9 (ICD-10-CM) - Type 2 diabetes mellitus without complications   Y11.6 (OPB--) - Obesity, unspecified      In time:   120pm             Out time:   150pm   Total Treatment Time (min):   30     SUBJECTIVE/ASSESSMENT  Current Wt: 184 Previous Wt: 187 Wt Change: -3     Initial Wt: 191 Total Wt change: -7 Height: 62     Changes in medication or medical history? Any new allergies, surgeries or procedures? NO    If yes, update Summary List        Nutrition Diagnosis        Diagnosis Status: Obesity R/T excessive caloric intake, lack of activity AEB pt with BMI >30; pt states somewhat sedentary   [x]  Improved []  No Change    []  Declined   []  Discontinued     Food and nutrition related knowledge deficit R/T lack of prior education for diabetes and nutrition AEB pt questions during session  [x]  Improved []  No Change    []  Declined   []  Discontinued        Nutrition Monitoring and Evaluation: Pt seen today for follow up visit. Pt has had some close calls with driving recently which has caused her to stop driving and she is now relying on  and son for rides. Pt routine with this has changed as she is not able to get to the gym as easily. Pt also noted some signs of depression and not wanting to do normal activity. Pt has been making sure to have boost drinks in the day as she is still noticing low appetite. Pt will make sure to have lunch, today had half PBJ sandwich and peaches. Pt has been drinking good amounts of water. Pt has not been as good about getting in vegetables or fiber. Pt missed some of her normal gym days but got on the treadmill yesterday. Pt is planning to see gastroenterology in the next month.      Previous goals:  - Have at least 2 Boost Supplement drinks per day   - Look to include 2-3 small snacks per day (protein and carb combination)   - Get in a minimum of 2x exercise per week. Nutrition Prescription and  Intervention Educated pt on the pathophysiology of Type II Diabetes, insulin resistance and the rationale for dietary modifications and increased activity. Educated pt on lean proteins, healthy fats, non-starchy vegetables, and complex carbohydrate food sources. Discussed limiting carbohydrates, label reading, meal timing, and appropriate serving sizes. Encouraged pt to avoid sugary beverages.  Reviewed meal builder tool, calorie and macro breakdown as well as exercise parameters     Patient Education:  [x]  Review current plan with patient   []  Other:    Handouts/  Information Provided: []  Carbohydrates  []  Protein  []  Fiber  []  Serving Sizes  []  Fluids  []  General guidelines []  Diabetes  []  Cholesterol  []  Sodium  []  SBGM  []  Food Journals  []  Others:      Patient Goals - Switch to SF jelly   - Add in fiber and vegetables   - Get in 1x week outdoor walk on top of existing exercise routine.   - Have salads for meals int he week      PLAN  [x]  Continue on current plan []  Follow-up PRN   []  Discharge due to :    [x]  Next appt: 2-4 weeks      Dietitian: Jumana Burt RDN    Date: 10/18/2022 Time: 120PM

## 2022-10-20 ENCOUNTER — OFFICE VISIT (OUTPATIENT)
Dept: INTERNAL MEDICINE CLINIC | Age: 67
End: 2022-10-20
Payer: MEDICARE

## 2022-10-20 VITALS
SYSTOLIC BLOOD PRESSURE: 127 MMHG | HEIGHT: 62 IN | OXYGEN SATURATION: 97 % | HEART RATE: 63 BPM | BODY MASS INDEX: 34.3 KG/M2 | TEMPERATURE: 97.9 F | DIASTOLIC BLOOD PRESSURE: 70 MMHG | RESPIRATION RATE: 14 BRPM | WEIGHT: 186.4 LBS

## 2022-10-20 DIAGNOSIS — R13.10 DYSPHAGIA, UNSPECIFIED TYPE: ICD-10-CM

## 2022-10-20 DIAGNOSIS — R41.89 IMPAIRED COGNITION: Primary | ICD-10-CM

## 2022-10-20 DIAGNOSIS — I10 ESSENTIAL HYPERTENSION: ICD-10-CM

## 2022-10-20 DIAGNOSIS — R68.89 OTHER GENERAL SYMPTOMS AND SIGNS: ICD-10-CM

## 2022-10-20 DIAGNOSIS — E11.9 TYPE 2 DIABETES MELLITUS WITHOUT COMPLICATION, WITHOUT LONG-TERM CURRENT USE OF INSULIN (HCC): ICD-10-CM

## 2022-10-20 DIAGNOSIS — I49.3 PVC (PREMATURE VENTRICULAR CONTRACTION): ICD-10-CM

## 2022-10-20 LAB
CHOLEST SERPL-MCNC: 153 MG/DL
CREAT UR-MCNC: 199 MG/DL
EST. AVERAGE GLUCOSE BLD GHB EST-MCNC: 160 MG/DL
HBA1C MFR BLD: 7.2 % (ref 4–5.6)
HDLC SERPL-MCNC: 38 MG/DL
HDLC SERPL: 4 {RATIO} (ref 0–5)
LDLC SERPL CALC-MCNC: 90.4 MG/DL (ref 0–100)
MICROALBUMIN UR-MCNC: 1.23 MG/DL
MICROALBUMIN/CREAT UR-RTO: 6 MG/G (ref 0–30)
TRIGL SERPL-MCNC: 123 MG/DL (ref ?–150)
VLDLC SERPL CALC-MCNC: 24.6 MG/DL

## 2022-10-20 PROCEDURE — 1101F PT FALLS ASSESS-DOCD LE1/YR: CPT | Performed by: INTERNAL MEDICINE

## 2022-10-20 PROCEDURE — G8536 NO DOC ELDER MAL SCRN: HCPCS | Performed by: INTERNAL MEDICINE

## 2022-10-20 PROCEDURE — G8427 DOCREV CUR MEDS BY ELIG CLIN: HCPCS | Performed by: INTERNAL MEDICINE

## 2022-10-20 PROCEDURE — 1090F PRES/ABSN URINE INCON ASSESS: CPT | Performed by: INTERNAL MEDICINE

## 2022-10-20 PROCEDURE — 3044F HG A1C LEVEL LT 7.0%: CPT | Performed by: INTERNAL MEDICINE

## 2022-10-20 PROCEDURE — 99215 OFFICE O/P EST HI 40 MIN: CPT | Performed by: INTERNAL MEDICINE

## 2022-10-20 PROCEDURE — G9717 DOC PT DX DEP/BP F/U NT REQ: HCPCS | Performed by: INTERNAL MEDICINE

## 2022-10-20 PROCEDURE — G8752 SYS BP LESS 140: HCPCS | Performed by: INTERNAL MEDICINE

## 2022-10-20 PROCEDURE — G8400 PT W/DXA NO RESULTS DOC: HCPCS | Performed by: INTERNAL MEDICINE

## 2022-10-20 PROCEDURE — G8417 CALC BMI ABV UP PARAM F/U: HCPCS | Performed by: INTERNAL MEDICINE

## 2022-10-20 PROCEDURE — G8754 DIAS BP LESS 90: HCPCS | Performed by: INTERNAL MEDICINE

## 2022-10-20 PROCEDURE — 2022F DILAT RTA XM EVC RTNOPTHY: CPT | Performed by: INTERNAL MEDICINE

## 2022-10-20 PROCEDURE — 3017F COLORECTAL CA SCREEN DOC REV: CPT | Performed by: INTERNAL MEDICINE

## 2022-10-20 RX ORDER — DESVENLAFAXINE 25 MG/1
25 TABLET, EXTENDED RELEASE ORAL
COMMUNITY

## 2022-10-20 RX ORDER — HYDROQUINONE 40 MG/G
CREAM TOPICAL
COMMUNITY
Start: 2022-09-27

## 2022-10-20 RX ORDER — CHLORTHALIDONE 50 MG/1
TABLET ORAL DAILY
COMMUNITY

## 2022-10-20 RX ORDER — PIMECROLIMUS 10 MG/G
CREAM TOPICAL
COMMUNITY
Start: 2022-09-29
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
## 2022-10-20 NOTE — ASSESSMENT & PLAN NOTE
Last visit discussed stopping clorthalidone and going back to hctz. Pt did not make change. BP actually in good range today. Okay to continue chlorthalidone instead. Cont spironolactone 098, bystolic 10 (highest dose tolerated, no HR room to go up)  Previous medications:  Amlodipine (ankle swelling at 2.5mg); coreg (switched to bystolic to see if that worked better), lasix (switched to spironolactone for better bp control)  Contraindications: ACEi (angioedema);  ARBs (due to ACEi allergy)

## 2022-10-21 LAB
T4 FREE SERPL-MCNC: 0.9 NG/DL (ref 0.8–1.5)
TSH SERPL DL<=0.05 MIU/L-ACNC: 2.52 UIU/ML (ref 0.36–3.74)
VIT B12 SERPL-MCNC: 473 PG/ML (ref 193–986)

## 2022-11-11 ENCOUNTER — TRANSCRIBE ORDER (OUTPATIENT)
Dept: SCHEDULING | Age: 67
End: 2022-11-11

## 2022-11-11 DIAGNOSIS — I10 ESSENTIAL HYPERTENSION, MALIGNANT: ICD-10-CM

## 2022-11-11 DIAGNOSIS — E11.9 TYPE 2 DIABETES MELLITUS WITHOUT COMPLICATIONS (HCC): Primary | ICD-10-CM

## 2022-11-11 DIAGNOSIS — I10 BENIGN HYPERTENSION: ICD-10-CM

## 2022-11-11 DIAGNOSIS — N18.31 STAGE 3A CHRONIC KIDNEY DISEASE (HCC): ICD-10-CM

## 2022-11-21 ENCOUNTER — OFFICE VISIT (OUTPATIENT)
Dept: INTERNAL MEDICINE CLINIC | Age: 67
End: 2022-11-21
Payer: MEDICARE

## 2022-11-21 VITALS
TEMPERATURE: 97.4 F | SYSTOLIC BLOOD PRESSURE: 130 MMHG | OXYGEN SATURATION: 96 % | DIASTOLIC BLOOD PRESSURE: 60 MMHG | HEIGHT: 62 IN | BODY MASS INDEX: 33.57 KG/M2 | RESPIRATION RATE: 14 BRPM | HEART RATE: 61 BPM | WEIGHT: 182.4 LBS

## 2022-11-21 DIAGNOSIS — H69.82 DYSFUNCTION OF LEFT EUSTACHIAN TUBE: ICD-10-CM

## 2022-11-21 DIAGNOSIS — R41.89 IMPAIRED COGNITION: Primary | ICD-10-CM

## 2022-11-21 DIAGNOSIS — E78.5 HYPERLIPIDEMIA, UNSPECIFIED HYPERLIPIDEMIA TYPE: ICD-10-CM

## 2022-11-21 DIAGNOSIS — I10 ESSENTIAL HYPERTENSION: ICD-10-CM

## 2022-11-21 PROBLEM — H69.92 DYSFUNCTION OF LEFT EUSTACHIAN TUBE: Status: ACTIVE | Noted: 2022-11-21

## 2022-11-21 LAB — MICROALBUMIN/CREATININE RATIO, EXTERNAL: 7

## 2022-11-21 PROCEDURE — G9717 DOC PT DX DEP/BP F/U NT REQ: HCPCS | Performed by: INTERNAL MEDICINE

## 2022-11-21 PROCEDURE — G8400 PT W/DXA NO RESULTS DOC: HCPCS | Performed by: INTERNAL MEDICINE

## 2022-11-21 PROCEDURE — G8417 CALC BMI ABV UP PARAM F/U: HCPCS | Performed by: INTERNAL MEDICINE

## 2022-11-21 PROCEDURE — G8536 NO DOC ELDER MAL SCRN: HCPCS | Performed by: INTERNAL MEDICINE

## 2022-11-21 PROCEDURE — 1090F PRES/ABSN URINE INCON ASSESS: CPT | Performed by: INTERNAL MEDICINE

## 2022-11-21 PROCEDURE — 1101F PT FALLS ASSESS-DOCD LE1/YR: CPT | Performed by: INTERNAL MEDICINE

## 2022-11-21 PROCEDURE — 99214 OFFICE O/P EST MOD 30 MIN: CPT | Performed by: INTERNAL MEDICINE

## 2022-11-21 PROCEDURE — G8427 DOCREV CUR MEDS BY ELIG CLIN: HCPCS | Performed by: INTERNAL MEDICINE

## 2022-11-21 PROCEDURE — 3017F COLORECTAL CA SCREEN DOC REV: CPT | Performed by: INTERNAL MEDICINE

## 2022-11-21 PROCEDURE — G8754 DIAS BP LESS 90: HCPCS | Performed by: INTERNAL MEDICINE

## 2022-11-21 PROCEDURE — G8752 SYS BP LESS 140: HCPCS | Performed by: INTERNAL MEDICINE

## 2022-11-21 RX ORDER — BUPROPION HYDROBROMIDE 174 MG/1
TABLET, EXTENDED RELEASE ORAL
COMMUNITY

## 2022-11-21 NOTE — ASSESSMENT & PLAN NOTE
Reports noting left ear throbbing pain  No infection, likely eustachian tube dysfunction.   Steroid nasal spray if needed

## 2022-11-21 NOTE — ASSESSMENT & PLAN NOTE
well controlled, continue current medications   Chlorthalidone 50, spironolactone 597, Bystolic 10 (highest dose tolerated, no heart rate room to go up)  Previous medications:  Amlodipine (ankle swelling at 2.5mg); coreg (switched to bystolic to see if that worked better), lasix (switched to spironolactone for better bp control)  Contraindications: ACEi (angioedema);  ARBs (due to ACEi allergy)

## 2022-11-21 NOTE — PATIENT INSTRUCTIONS
Make appt with neurology  Get MRI - if negative, we will plan to stop cholesterol medication. Until your MRI is back, continue taking our cholesterol medication.

## 2022-11-21 NOTE — PROGRESS NOTES
Note   Chief Complaint   Memory    Basil Walker is a 78 y.o. female     1. Impaired cognition  Assessment & Plan:   Hasn't driven too much since last visit, only short distances  Talked to her psych doc after last visit regarding these symptoms; she doesn't think the meds are causing symptoms per patient   Trintellix stopped and started on bupropion 174  Had mentioned balance issues prior to me before - feels like gait isn't as steady but maybe not as bad lately   Check MRI given cognition and balance problems. I think statin is less likely to be causing her symptoms but she would like to trial off. If negative, will plan trial of atorvastatin. Neurology referral  Orders:  -     MRI BRAIN WO CONT; Future  -     REFERRAL TO NEUROLOGY  2. Dysfunction of left eustachian tube  Assessment & Plan:  Reports noting left ear throbbing pain  No infection, likely eustachian tube dysfunction. Steroid nasal spray if needed  3. Essential hypertension  Assessment & Plan:   well controlled, continue current medications   Chlorthalidone 50, spironolactone 472, Bystolic 10 (highest dose tolerated, no heart rate room to go up)  Previous medications:  Amlodipine (ankle swelling at 2.5mg); coreg (switched to bystolic to see if that worked better), lasix (switched to spironolactone for better bp control)  Contraindications: ACEi (angioedema); ARBs (due to ACEi allergy)   4. Hyperlipidemia, unspecified hyperlipidemia type  Assessment & Plan:  See impaired cognition note. May trial off atorvastatin     Benefits, risks, possible drug interactions, and side effects of all new medications were reviewed with the patient. Pt verbalized understanding. Return to clinic: Pending imaging    An electronic signature was used to authenticate this note.   Anirudh Azevedo MD  [facility]  11/21/2022    Future Appointments   Date Time Provider Sebastian Weiner   11/22/2022  1:00 PM VAS ROOM 2 [facility] [address]   11/29/2022  3:15 PM [facility]LE [facility]   12/6/2022  1:30 PM [facility]   7/42/1726 93:59 AM José Miguel Robledo MD [facility] BS AMB   3/9/2023  9:00 AM MD FORTINO Rubio [facility] BS AMB        Objective   Vitals:       Visit Vitals  /60 (BP 1 Location: Left upper arm, BP Patient Position: Sitting, BP Cuff Size: Small adult)   Pulse 61   Temp 97.4 °F (36.3 °C) (Oral)   Resp 14   Ht 5' 2\" (1.575 m)   Wt 182 lb 6.4 oz (82.7 kg)   SpO2 96%   BMI 33.36 kg/m²        Physical Exam  Constitutional:       Appearance: Normal appearance. She is not ill-appearing. Cardiovascular:      Rate and Rhythm: Normal rate and regular rhythm. Heart sounds: No murmur heard. No friction rub. No gallop. Pulmonary:      Effort: No respiratory distress. Breath sounds: Normal breath sounds. No wheezing, rhonchi or rales. Neurological:      Mental Status: She is alert. Current Outpatient Medications   Medication Sig    buPROPion HBr (Aplenzin) 174 mg Tb24 Take  by mouth. chlorthalidone (HYGROTEN) 50 mg tablet Take  by mouth daily. desvenlafaxine succinate (PRISTIQ) 25 mg ER tablet Take 25 mg by mouth. hydroquinone (ESOTERICA, MELQUIN) 4 % topical cream APPLY TO DARK SPOTS ON FACE TWICE DAILY    pimecrolimus (ELIDEL) 1 % topical cream APPLY TO THE AFFECTED AREA(S) OF FACE TWICE DAILY AS NEEDED    spironolactone (ALDACTONE) 100 mg tablet Take 1 Tablet by mouth daily. Indications: high blood pressure    nebivoloL (BYSTOLIC) 10 mg tablet Take 1 Tablet by mouth daily. Indications: high blood pressure    ferrous sulfate (IRON) 325 mg (65 mg iron) EC tablet Take 1 Tablet by mouth Daily (before breakfast). pantoprazole (PROTONIX) 40 mg tablet Take 1 Tablet by mouth daily.  Indications: gastroesophageal reflux disease    metFORMIN ER (GLUCOPHAGE XR) 500 mg tablet TAKE ONE TABLET BY MOUTH TWICE DAILY    atorvastatin (LIPITOR) 20 mg tablet TAKE ONE TABLET BY MOUTH EVERY EVENING    EPINEPHrine (EPIPEN) 0.3 mg/0.3 mL injection INJECT INTO OUTER THIGH FOR SEVERE ALLERGIC REACTION. CALL 911 AFTER USE.    flecainide (TAMBOCOR) 50 mg tablet Take 50 mg by mouth two (2) times a day. montelukast (SINGULAIR) 10 mg tablet Take 10 mg by mouth daily. lamoTRIgine (LaMICtal) 100 mg tablet Take 150 mg by mouth daily. cetirizine (ZYRTEC) 10 mg tablet Take 10 mg by mouth daily as needed for Allergies. Only taking prior to allergy shots     No current facility-administered medications for this visit.

## 2022-11-21 NOTE — ASSESSMENT & PLAN NOTE
Hasn't driven too much since last visit, only short distances  Talked to her psych doc after last visit regarding these symptoms; she doesn't think the meds are causing symptoms per patient   Trintellix stopped and started on bupropion 174  Had mentioned balance issues prior to me before - feels like gait isn't as steady but maybe not as bad lately   Check MRI given cognition and balance problems. I think statin is less likely to be causing her symptoms but she would like to trial off. If negative, will plan trial of atorvastatin.   Neurology referral

## 2022-11-23 ENCOUNTER — TRANSCRIBE ORDER (OUTPATIENT)
Dept: SCHEDULING | Age: 67
End: 2022-11-23

## 2022-11-23 DIAGNOSIS — E83.52 HYPERCALCEMIA: Primary | ICD-10-CM

## 2022-11-26 ENCOUNTER — HOSPITAL ENCOUNTER (OUTPATIENT)
Dept: VASCULAR SURGERY | Age: 67
Discharge: HOME | End: 2022-11-26
Attending: INTERNAL MEDICINE
Payer: MEDICARE

## 2022-11-26 DIAGNOSIS — I10 ESSENTIAL HYPERTENSION, MALIGNANT: ICD-10-CM

## 2022-11-26 DIAGNOSIS — N18.31 STAGE 3A CHRONIC KIDNEY DISEASE (HCC): ICD-10-CM

## 2022-11-26 DIAGNOSIS — E11.9 TYPE 2 DIABETES MELLITUS WITHOUT COMPLICATIONS (HCC): ICD-10-CM

## 2022-11-26 LAB
RIGHT RENAL DIST DIAS: 23.8 CM/S
RIGHT RENAL DIST RI: 0.86
RIGHT RENAL DIST SYS: 169 CM/S
RIGHT RENAL LOWER PARENCHYMA MAX: 37.6 CM/S
RIGHT RENAL LOWER PARENCHYMA MIN: 10.1 CM/S
RIGHT RENAL LOWER PARENCHYMA RI: 0.73
RIGHT RENAL MID DIAS: 27.6 CM/S
RIGHT RENAL MID RI: 0.83
RIGHT RENAL MID SYS: 160.8 CM/S
RIGHT RENAL MIDDLE PARENCHYMA MAX: 39.8 CM/S
RIGHT RENAL MIDDLE PARENCHYMA MIN: 11.2 CM/S
RIGHT RENAL MIDDLE PARENCHYMA RI: 0.72
RIGHT RENAL PROX DIAS: 29.8 CM/S
RIGHT RENAL PROX RI: 0.8
RIGHT RENAL PROX SYS: 149.5 CM/S
RIGHT RENAL UPPER PARENCHYMA MAX: 49.7 CM/S
RIGHT RENAL UPPER PARENCHYMA MIN: 12.3 CM/S
RIGHT RENAL UPPER PARENCHYMA RI: 0.75

## 2022-11-26 PROCEDURE — 93976 VASCULAR STUDY: CPT

## 2022-11-26 PROCEDURE — 93975 VASCULAR STUDY: CPT

## 2022-11-28 ENCOUNTER — TELEPHONE (OUTPATIENT)
Dept: INTERNAL MEDICINE CLINIC | Age: 67
End: 2022-11-28

## 2022-11-28 NOTE — TELEPHONE ENCOUNTER
Nurse called and advised patient that office is out of [phone] and will need to come back another time for vaccine. Advised patient to call the office to confirm that we have the vaccine in stock before coming. Patient was thankful.

## 2022-11-28 NOTE — TELEPHONE ENCOUNTER
----- Message from Odell Alvarez MD sent at [phone] 11:30 AM EST -----  Regarding: PNA  Please call the pt/send message - I just found out we're out of the pNA vaccine, and she was planning to come to our office to get that done. She'll need to come back later.

## 2022-11-29 ENCOUNTER — HOSPITAL ENCOUNTER (OUTPATIENT)
Dept: MRI IMAGING | Age: 67
Discharge: HOME | End: 2022-11-29
Attending: INTERNAL MEDICINE
Payer: MEDICARE

## 2022-11-29 DIAGNOSIS — R41.89 IMPAIRED COGNITION: ICD-10-CM

## 2022-11-29 PROCEDURE — 70551 MRI BRAIN STEM W/O DYE: CPT

## 2022-12-01 DIAGNOSIS — I10 ESSENTIAL (PRIMARY) HYPERTENSION: ICD-10-CM

## 2022-12-01 RX ORDER — CHLORTHALIDONE 50 MG/1
TABLET ORAL
Qty: 90 TABLET | Refills: 3 | Status: SHIPPED | OUTPATIENT
Start: 2022-12-01

## 2022-12-05 ENCOUNTER — HOSPITAL ENCOUNTER (OUTPATIENT)
Dept: NUCLEAR MEDICINE | Age: 67
Discharge: HOME | End: 2022-12-05
Attending: INTERNAL MEDICINE
Payer: MEDICARE

## 2022-12-05 ENCOUNTER — TRANSCRIBE ORDER (OUTPATIENT)
Dept: SCHEDULING | Age: 67
End: 2022-12-05

## 2022-12-05 ENCOUNTER — HOSPITAL ENCOUNTER (OUTPATIENT)
Dept: ULTRASOUND IMAGING | Age: 67
Discharge: HOME | End: 2022-12-05
Attending: INTERNAL MEDICINE
Payer: MEDICARE

## 2022-12-05 DIAGNOSIS — N18.31 STAGE 3A CHRONIC KIDNEY DISEASE (HCC): Primary | ICD-10-CM

## 2022-12-05 DIAGNOSIS — E83.52 HYPERCALCEMIA: ICD-10-CM

## 2022-12-05 DIAGNOSIS — N18.31 STAGE 3A CHRONIC KIDNEY DISEASE (HCC): ICD-10-CM

## 2022-12-05 PROCEDURE — 76536 US EXAM OF HEAD AND NECK: CPT

## 2022-12-05 PROCEDURE — A9500 TC99M SESTAMIBI: HCPCS

## 2022-12-05 RX ORDER — TETRAKIS(2-METHOXYISOBUTYLISOCYANIDE)COPPER(I) TETRAFLUOROBORATE 1 MG/ML
18 INJECTION, POWDER, LYOPHILIZED, FOR SOLUTION INTRAVENOUS
Status: COMPLETED | OUTPATIENT
Start: 2022-12-05 | End: 2022-12-05

## 2022-12-05 RX ADMIN — TETRAKIS(2-METHOXYISOBUTYLISOCYANIDE)COPPER(I) TETRAFLUOROBORATE 18 MILLICURIE: 1 INJECTION, POWDER, LYOPHILIZED, FOR SOLUTION INTRAVENOUS at 08:45

## 2022-12-06 ENCOUNTER — HOSPITAL ENCOUNTER (OUTPATIENT)
Dept: NUTRITION | Age: 67
Discharge: HOME | End: 2022-12-06
Payer: MEDICARE

## 2022-12-06 DIAGNOSIS — E11.9 TYPE 2 DIABETES MELLITUS WITHOUT COMPLICATION, WITHOUT LONG-TERM CURRENT USE OF INSULIN (HCC): Primary | ICD-10-CM

## 2022-12-06 DIAGNOSIS — E66.01 SEVERE OBESITY (BMI 35.0-35.9 WITH COMORBIDITY) (HCC): ICD-10-CM

## 2022-12-06 PROCEDURE — 97803 MED NUTRITION INDIV SUBSEQ: CPT | Performed by: DIETITIAN, REGISTERED

## 2022-12-06 NOTE — PROGRESS NOTES
NUTRITION - FOLLOW-UP TREATMENT NOTE  Patient Name: Daljit Donnelly         Date: 2022  : 1955    YES Patient  Verified  Diagnosis:   E11.9 (ICD-10-CM) - Type 2 diabetes mellitus without complications   P61.8 (PJP-88-BA) - Obesity, unspecified      In time:   130pm             Out time:   200pm   Total Treatment Time (min):   30     SUBJECTIVE/ASSESSMENT  Current Wt: 179 Previous Wt: 184 Wt Change: -5     Initial Wt: 191 Total Wt change: -12 Height: 62     Changes in medication or medical history? Any new allergies, surgeries or procedures? NO    If yes, update Summary List        Nutrition Diagnosis        Diagnosis Status: Obesity R/T excessive caloric intake, lack of activity AEB pt with BMI >30; pt states somewhat sedentary   [x]  Improved []  No Change    []  Declined   []  Discontinued     Food and nutrition related knowledge deficit R/T lack of prior education for diabetes and nutrition AEB pt questions during session  [x]  Improved []  No Change    []  Declined   []  Discontinued        Nutrition Monitoring and Evaluation: Pt seen today for follow up visit. Pt has been doing very well since our last visit. Pt is having protein drinks 1-2x daily at meals to help supplement when appetite is low. When eating meals pt is making sure to have protein and vegetables. Pt is limiting sweets and carb heavy foods. Pt notices that her BP is doing better and she has been able to come off of her cholesterol medication. We reviewed diet considerations for lowering cholesterol. Pt has not been driving and is relying on  to help get around which has decreased pt ability to get to the gym for water aerobics and classes.      Previous goals:  - Switch to SF jelly   - Add in fiber and vegetables   - Get in 1x week outdoor walk on top of existing exercise routine.   - Have salads for meals int he week      Nutrition Prescription and  Intervention Educated pt on the pathophysiology of Type II Diabetes, insulin resistance and the rationale for dietary modifications and increased activity. Educated pt on lean proteins, healthy fats, non-starchy vegetables, and complex carbohydrate food sources. Discussed limiting carbohydrates, label reading, meal timing, and appropriate serving sizes. Encouraged pt to avoid sugary beverages. Reviewed meal builder tool, calorie and macro breakdown as well as exercise parameters  Reviewed diet considerations for lowering cholesterol.      Patient Education:  [x]  Review current plan with patient   []  Other:    Handouts/  Information Provided: []  Carbohydrates  []  Protein  []  Fiber  []  Serving Sizes  []  Fluids  []  General guidelines []  Diabetes  [x]  Cholesterol  []  Sodium  []  SBGM  []  Food Journals  []  Others:      Patient Goals - Add in at home exercises   - Focus on incorporating changes to lower cholesterol (limiting saturated fats, switch to smart balance butter)      PLAN  [x]  Continue on current plan []  Follow-up PRN   []  Discharge due to :    [x]  Next appt: 2-4 weeks      Dietitian: Andres Del Rosario RDN    Date: 12/6/2022 Time: 130PM

## 2023-02-21 ENCOUNTER — OFFICE VISIT (OUTPATIENT)
Dept: INTERNAL MEDICINE CLINIC | Age: 68
End: 2023-02-21
Payer: MEDICARE

## 2023-02-21 VITALS
OXYGEN SATURATION: 97 % | DIASTOLIC BLOOD PRESSURE: 73 MMHG | RESPIRATION RATE: 14 BRPM | SYSTOLIC BLOOD PRESSURE: 131 MMHG | HEART RATE: 63 BPM | WEIGHT: 171.6 LBS | TEMPERATURE: 98.1 F | BODY MASS INDEX: 31.58 KG/M2 | HEIGHT: 62 IN

## 2023-02-21 DIAGNOSIS — F33.0 MILD EPISODE OF RECURRENT MAJOR DEPRESSIVE DISORDER (HCC): ICD-10-CM

## 2023-02-21 DIAGNOSIS — I10 ESSENTIAL HYPERTENSION: Primary | ICD-10-CM

## 2023-02-21 DIAGNOSIS — E11.22 TYPE 2 DIABETES MELLITUS WITH STAGE 3A CHRONIC KIDNEY DISEASE, WITHOUT LONG-TERM CURRENT USE OF INSULIN (HCC): ICD-10-CM

## 2023-02-21 DIAGNOSIS — R41.89 IMPAIRED COGNITION: ICD-10-CM

## 2023-02-21 DIAGNOSIS — N18.31 TYPE 2 DIABETES MELLITUS WITH STAGE 3A CHRONIC KIDNEY DISEASE, WITHOUT LONG-TERM CURRENT USE OF INSULIN (HCC): ICD-10-CM

## 2023-02-21 DIAGNOSIS — R13.10 DYSPHAGIA, UNSPECIFIED TYPE: ICD-10-CM

## 2023-02-21 PROBLEM — E11.29 TYPE 2 DIABETES MELLITUS WITH KIDNEY COMPLICATION, WITHOUT LONG-TERM CURRENT USE OF INSULIN (HCC): Status: ACTIVE | Noted: 2021-01-26

## 2023-02-21 PROBLEM — F33.9 EPISODE OF RECURRENT MAJOR DEPRESSIVE DISORDER, UNSPECIFIED DEPRESSION EPISODE SEVERITY (HCC): Status: ACTIVE | Noted: 2023-02-21

## 2023-02-21 PROCEDURE — 2022F DILAT RTA XM EVC RTNOPTHY: CPT | Performed by: INTERNAL MEDICINE

## 2023-02-21 PROCEDURE — G8536 NO DOC ELDER MAL SCRN: HCPCS | Performed by: INTERNAL MEDICINE

## 2023-02-21 PROCEDURE — G8427 DOCREV CUR MEDS BY ELIG CLIN: HCPCS | Performed by: INTERNAL MEDICINE

## 2023-02-21 PROCEDURE — 1090F PRES/ABSN URINE INCON ASSESS: CPT | Performed by: INTERNAL MEDICINE

## 2023-02-21 PROCEDURE — 99214 OFFICE O/P EST MOD 30 MIN: CPT | Performed by: INTERNAL MEDICINE

## 2023-02-21 PROCEDURE — 1101F PT FALLS ASSESS-DOCD LE1/YR: CPT | Performed by: INTERNAL MEDICINE

## 2023-02-21 PROCEDURE — 3017F COLORECTAL CA SCREEN DOC REV: CPT | Performed by: INTERNAL MEDICINE

## 2023-02-21 PROCEDURE — G9717 DOC PT DX DEP/BP F/U NT REQ: HCPCS | Performed by: INTERNAL MEDICINE

## 2023-02-21 PROCEDURE — G8400 PT W/DXA NO RESULTS DOC: HCPCS | Performed by: INTERNAL MEDICINE

## 2023-02-21 PROCEDURE — 3046F HEMOGLOBIN A1C LEVEL >9.0%: CPT | Performed by: INTERNAL MEDICINE

## 2023-02-21 PROCEDURE — G8417 CALC BMI ABV UP PARAM F/U: HCPCS | Performed by: INTERNAL MEDICINE

## 2023-02-21 RX ORDER — HYDRALAZINE HYDROCHLORIDE 50 MG/1
50 TABLET, FILM COATED ORAL 3 TIMES DAILY
COMMUNITY

## 2023-02-21 NOTE — PROGRESS NOTES
Note   Chief Complaint   Follow up    Kristofer Flanagan is a 78 y.o. female     Reviewed her medication list and indications for medications and which ones might be contributing to blood pressure  Advised that I defer any changes to her psych meds to her psych office     1. Essential hypertension  Assessment & Plan:   BP seems to get higher during the day  Nephro started hydralazine - now on 50 TID   Well controlled in clinic today. continue chlorthalidone 50, hydralazine 50 TID, bystolic 10, spironolactone 100  Previous medications:  Amlodipine (ankle swelling at 2.5mg); coreg (switched to bystolic to see if that worked better), lasix (switched to spironolactone for better bp control)  Contraindications: ACEi (angioedema); ARBs (due to ACEi allergy)   2. Impaired cognition  Assessment & Plan:  MRI negative, advised to stop atorvastatin but she's not sure if she actually did  Advised to send me a message to see if she actually stopped atorvastatin  Neuro appt next month  3. Type 2 diabetes mellitus with stage 3a chronic kidney disease, without long-term current use of insulin ([facility])  Assessment & Plan:  Last a1c 7.2%  Has had eye exam in the past year   At goal, continue metformin  BID  Previous medications: Ozempic (hypoglycemia)  4. Dysphagia, unspecified type  Assessment & Plan:   EGD/colonoscopy was normal per pt - signed release for [facility]  5. Mild episode of recurrent major depressive disorder ([facility]  Assessment & Plan:   monitored by specialist. No acute findings meriting change in the plan   Defer changes to med to psych       Benefits, risks, possible drug interactions, and side effects of all new medications were reviewed with the patient. Pt verbalized understanding. Return to clinic:  3 months for mcw    An electronic signature was used to authenticate this note.   Elsa Baez MD  [facility]  2/21/2023    Future Appointments   Date Time Provider Department Center   3/9/2023  9:00 AM MD FORTINO Coates [facility] BS AMB   9/12/7549  4:30 AM Marie Phillips MD [facility]ending [facility] to [facility] BS AMB   9/21/2023  2:30 PM [facility]        Objective   Vitals:       Visit Vitals  /73 (BP 1 Location: Left upper arm, BP Patient Position: Sitting, BP Cuff Size: Small adult)   Pulse 63   Temp 98.1 °F (36.7 °C) (Oral)   Resp 14   Ht 5' 2\" (1.575 m)   Wt 171 lb 9.6 oz (77.8 kg)   SpO2 97%   BMI 31.39 kg/m²        Physical Exam  Constitutional:       Appearance: Normal appearance. She is not ill-appearing. Cardiovascular:      Rate and Rhythm: Normal rate and regular rhythm. Heart sounds: No murmur heard. No friction rub. No gallop. Pulmonary:      Effort: No respiratory distress. Breath sounds: Normal breath sounds. No wheezing, rhonchi or rales. Neurological:      Mental Status: She is alert. Current Outpatient Medications   Medication Sig    hydrALAZINE (APRESOLINE) 50 mg tablet Take 50 mg by mouth three (3) times daily. atorvastatin (LIPITOR) 20 mg tablet TAKE ONE TABLET BY MOUTH EVERY EVENING    chlorthalidone (HYGROTEN) 50 mg tablet TAKE ONE TABLET BY MOUTH EVERY DAY    buPROPion HBr (Aplenzin) 174 mg Tb24 Take  by mouth. desvenlafaxine succinate (PRISTIQ) 25 mg ER tablet Take 25 mg by mouth. hydroquinone (ESOTERICA, MELQUIN) 4 % topical cream APPLY TO DARK SPOTS ON FACE TWICE DAILY    pimecrolimus (ELIDEL) 1 % topical cream APPLY TO THE AFFECTED AREA(S) OF FACE TWICE DAILY AS NEEDED    spironolactone (ALDACTONE) 100 mg tablet Take 1 Tablet by mouth daily. Indications: high blood pressure    nebivoloL (BYSTOLIC) 10 mg tablet Take 1 Tablet by mouth daily. Indications: high blood pressure    ferrous sulfate (IRON) 325 mg (65 mg iron) EC tablet Take 1 Tablet by mouth Daily (before breakfast). pantoprazole (PROTONIX) 40 mg tablet Take 1 Tablet by mouth daily.  Indications: gastroesophageal reflux disease    metFORMIN ER (GLUCOPHAGE XR) 500 mg tablet TAKE ONE TABLET BY MOUTH TWICE DAILY    EPINEPHrine (EPIPEN) 0.3 mg/0.3 mL injection INJECT INTO OUTER THIGH FOR SEVERE ALLERGIC REACTION. CALL 911 AFTER USE.    flecainide (TAMBOCOR) 50 mg tablet Take 50 mg by mouth two (2) times a day. montelukast (SINGULAIR) 10 mg tablet Take 10 mg by mouth daily. lamoTRIgine (LaMICtal) 100 mg tablet Take 150 mg by mouth daily. cetirizine (ZYRTEC) 10 mg tablet Take 10 mg by mouth daily as needed for Allergies. Only taking prior to allergy shots     No current facility-administered medications for this visit.

## 2023-02-22 NOTE — ASSESSMENT & PLAN NOTE
MRI negative, advised to stop atorvastatin but she's not sure if she actually did  Advised to send me a message to see if she actually stopped atorvastatin  Neuro appt next month

## 2023-02-22 NOTE — ASSESSMENT & PLAN NOTE
Last a1c 7.2%  Has had eye exam in the past year   At goal, continue metformin  BID  Previous medications: Ozempic (hypoglycemia)

## 2023-02-22 NOTE — ASSESSMENT & PLAN NOTE
BP seems to get higher during the day  Nephro started hydralazine - now on 50 TID   Well controlled in clinic today. continue chlorthalidone 50, hydralazine 50 TID, bystolic 10, spironolactone 100  Previous medications:  Amlodipine (ankle swelling at 2.5mg); coreg (switched to bystolic to see if that worked better), lasix (switched to spironolactone for better bp control)  Contraindications: ACEi (angioedema);  ARBs (due to ACEi allergy)

## 2023-02-22 NOTE — ASSESSMENT & PLAN NOTE
monitored by specialist. No acute findings meriting change in the plan   Defer changes to med to psych

## 2023-02-27 DIAGNOSIS — K21.9 GASTROESOPHAGEAL REFLUX DISEASE WITHOUT ESOPHAGITIS: ICD-10-CM

## 2023-02-27 DIAGNOSIS — I10 ESSENTIAL HYPERTENSION: ICD-10-CM

## 2023-02-27 RX ORDER — NEBIVOLOL 10 MG/1
TABLET ORAL
Qty: 90 TABLET | Refills: 3 | Status: SHIPPED | OUTPATIENT
Start: 2023-02-27

## 2023-02-27 RX ORDER — PANTOPRAZOLE SODIUM 40 MG/1
TABLET, DELAYED RELEASE ORAL
Qty: 90 TABLET | Refills: 3 | Status: SHIPPED | OUTPATIENT
Start: 2023-02-27

## 2023-03-09 ENCOUNTER — OFFICE VISIT (OUTPATIENT)
Dept: NEUROLOGY | Age: 68
End: 2023-03-09
Payer: MEDICARE

## 2023-03-09 VITALS
TEMPERATURE: 96.8 F | HEART RATE: 69 BPM | WEIGHT: 168 LBS | HEIGHT: 62 IN | DIASTOLIC BLOOD PRESSURE: 74 MMHG | SYSTOLIC BLOOD PRESSURE: 136 MMHG | BODY MASS INDEX: 30.91 KG/M2 | RESPIRATION RATE: 18 BRPM | OXYGEN SATURATION: 98 %

## 2023-03-09 DIAGNOSIS — R26.81 GAIT INSTABILITY: Primary | ICD-10-CM

## 2023-03-09 DIAGNOSIS — G47.33 OSA (OBSTRUCTIVE SLEEP APNEA): ICD-10-CM

## 2023-03-09 DIAGNOSIS — R40.4 TRANSIENT ALTERATION OF AWARENESS: ICD-10-CM

## 2023-03-09 PROCEDURE — 3017F COLORECTAL CA SCREEN DOC REV: CPT | Performed by: PSYCHIATRY & NEUROLOGY

## 2023-03-09 PROCEDURE — 3078F DIAST BP <80 MM HG: CPT | Performed by: PSYCHIATRY & NEUROLOGY

## 2023-03-09 PROCEDURE — 3074F SYST BP LT 130 MM HG: CPT | Performed by: PSYCHIATRY & NEUROLOGY

## 2023-03-09 PROCEDURE — G9899 SCRN MAM PERF RSLTS DOC: HCPCS | Performed by: PSYCHIATRY & NEUROLOGY

## 2023-03-09 PROCEDURE — G8400 PT W/DXA NO RESULTS DOC: HCPCS | Performed by: PSYCHIATRY & NEUROLOGY

## 2023-03-09 PROCEDURE — G8417 CALC BMI ABV UP PARAM F/U: HCPCS | Performed by: PSYCHIATRY & NEUROLOGY

## 2023-03-09 PROCEDURE — G8536 NO DOC ELDER MAL SCRN: HCPCS | Performed by: PSYCHIATRY & NEUROLOGY

## 2023-03-09 PROCEDURE — G8427 DOCREV CUR MEDS BY ELIG CLIN: HCPCS | Performed by: PSYCHIATRY & NEUROLOGY

## 2023-03-09 PROCEDURE — 1090F PRES/ABSN URINE INCON ASSESS: CPT | Performed by: PSYCHIATRY & NEUROLOGY

## 2023-03-09 PROCEDURE — 1101F PT FALLS ASSESS-DOCD LE1/YR: CPT | Performed by: PSYCHIATRY & NEUROLOGY

## 2023-03-09 PROCEDURE — 99205 OFFICE O/P NEW HI 60 MIN: CPT | Performed by: PSYCHIATRY & NEUROLOGY

## 2023-03-09 PROCEDURE — 1123F ACP DISCUSS/DSCN MKR DOCD: CPT | Performed by: PSYCHIATRY & NEUROLOGY

## 2023-03-09 PROCEDURE — G9717 DOC PT DX DEP/BP F/U NT REQ: HCPCS | Performed by: PSYCHIATRY & NEUROLOGY

## 2023-03-09 NOTE — LETTER
3/13/2023    Patient: Preston Alegria   YOB: 1955   Date of Visit: 3/9/2023     Bryce Kathleen, [address] [place]  [address]  [address] [place] [address] [address]  [facility] [address]    Dear Bryce Kathleen MD,      Thank you for referring Ms. Preston Alegria to [address] [place] [address] for evaluation. My notes for this consultation are attached. If you have questions, please do not hesitate to call me. I look forward to following your patient along with you.       Sincerely,    Meggan Mcallister MD
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
## 2023-03-09 NOTE — PROGRESS NOTES
Chief Complaint   Patient presents with    New Patient     Patient referred by Dr. Tawny Wood for balance issues. Patient reports she has had unsteady gait for about 2 years. Patient states she fell about 2 times last year but did not hurt herself.          Visit Vitals  /74   Pulse 69   Temp 96.8 °F (36 °C) (Temporal)   Resp 18   Ht 5' 2\" (1.575 m)   Wt 76.2 kg (168 lb)   SpO2 98%   BMI 30.73 kg/m²

## 2023-03-13 DIAGNOSIS — I10 ESSENTIAL HYPERTENSION: ICD-10-CM

## 2023-03-13 RX ORDER — SPIRONOLACTONE 100 MG/1
TABLET, FILM COATED ORAL
Qty: 90 TABLET | Refills: 3 | Status: SHIPPED | OUTPATIENT
Start: 2023-03-13

## 2023-03-28 ENCOUNTER — HOSPITAL ENCOUNTER (OUTPATIENT)
Dept: NEUROLOGY | Age: 68
Discharge: HOME | End: 2023-03-28
Attending: PSYCHIATRY & NEUROLOGY
Payer: MEDICARE

## 2023-03-28 DIAGNOSIS — R40.4 TRANSIENT ALTERATION OF AWARENESS: ICD-10-CM

## 2023-03-28 PROCEDURE — 95816 EEG AWAKE AND DROWSY: CPT

## 2023-03-29 PROCEDURE — 95816 EEG AWAKE AND DROWSY: CPT | Performed by: PSYCHIATRY & NEUROLOGY

## 2023-03-29 NOTE — PROCEDURES
[facility] [address]     Electroencephalogram Report    Procedure ID: SFA  Procedure Date: 03/28/2023   Patient Name: Amandeep Askwe YOB: 1955   Procedure Type: Routine Medical Record No: [id number]     INDICATION: Altered mental status    STATE: Awake and drowsy . DESCRIPTION OF PROCEDURE: Electrodes were applied in accordance with the international 10-20 system of electrode placement. EEG was reviewed in both bipolar and referential montages. Description of Activity:  During wakefulness, there is continuous runs of 9-10 Hz symmetric posterior alpha rhythm, that attenuate symmetrically with eye opening. Low voltage beta activity occurs symmetrically at the anterior head regions bilaterally. During drowsiness, there is attenuation of the alpha rhythm and low voltage theta activity occurs bilaterally. Intermittent photic stimulation was performed and did not induce posterior driving responses. No sharp or spike discharges, seizures or epileptiform discharges seen. No focal asymmetry. Clinical Interpretation: This EEG, performed during wakefulness and drowsiness is normal. There is no focal asymmetry, seizures or epileptiform discharges seen. Medications:  Current Outpatient Medications   Medication Sig    spironolactone (ALDACTONE) 100 mg tablet TAKE ONE TABLET BY MOUTH EVERY DAY FOR HIGH BLOOD PRESSURE.    pantoprazole (PROTONIX) 40 mg tablet TAKE ONE TABLET BY MOUTH EVERY DAY    nebivoloL (BYSTOLIC) 10 mg tablet TAKE ONE TABLET BY MOUTH EVERY DAY    hydrALAZINE (APRESOLINE) 50 mg tablet Take 50 mg by mouth three (3) times daily. atorvastatin (LIPITOR) 20 mg tablet TAKE ONE TABLET BY MOUTH EVERY EVENING    chlorthalidone (HYGROTEN) 50 mg tablet TAKE ONE TABLET BY MOUTH EVERY DAY    buPROPion HBr (Aplenzin) 174 mg Tb24 Take  by mouth.     hydroquinone (ESOTERICA, MELQUIN) 4 % topical cream APPLY TO DARK SPOTS ON FACE TWICE DAILY    pimecrolimus (ELIDEL) 1 % topical cream APPLY TO THE AFFECTED AREA(S) OF FACE TWICE DAILY AS NEEDED    ferrous sulfate (IRON) 325 mg (65 mg iron) EC tablet Take 1 Tablet by mouth Daily (before breakfast). metFORMIN ER (GLUCOPHAGE XR) 500 mg tablet TAKE ONE TABLET BY MOUTH TWICE DAILY    EPINEPHrine (EPIPEN) 0.3 mg/0.3 mL injection INJECT INTO OUTER THIGH FOR SEVERE ALLERGIC REACTION. CALL 911 AFTER USE.    flecainide (TAMBOCOR) 50 mg tablet Take 50 mg by mouth two (2) times a day. montelukast (SINGULAIR) 10 mg tablet Take 10 mg by mouth daily. lamoTRIgine (LaMICtal) 100 mg tablet Take 150 mg by mouth daily. cetirizine (ZYRTEC) 10 mg tablet Take 10 mg by mouth daily as needed for Allergies. Only taking prior to allergy shots     No current facility-administered medications for this encounter.

## 2023-04-27 ENCOUNTER — OFFICE VISIT (OUTPATIENT)
Dept: NEUROLOGY | Age: 68
End: 2023-04-27
Payer: MEDICARE

## 2023-04-27 DIAGNOSIS — R26.81 GAIT INSTABILITY: Primary | ICD-10-CM

## 2023-04-27 PROCEDURE — 95910 NRV CNDJ TEST 7-8 STUDIES: CPT | Performed by: PSYCHIATRY & NEUROLOGY

## 2023-04-27 PROCEDURE — 95886 MUSC TEST DONE W/N TEST COMP: CPT | Performed by: PSYCHIATRY & NEUROLOGY

## 2023-04-27 NOTE — PROCEDURES
EMG/ NCS Report  [facility] [address] [facility] [address]  [facility] [address] [facility], [address] [place], [address]   Ph: [phone]   FAX: [phone]/ [phone]  Test Date:  2023    Patient: Nicko Mcdaniels : 1955 Physician: Lu Lanza MD   Sex: Female Height: 5' 2\" Ref Phys: Lu Lanza MD   ID#: [id number] Weight: 168 lbs. Technician:      Patient Complaints:  Patient complaining of episodes of wobbliness and falls with prolonged walking x 2 years. History of DM. Assess for neuropathy vs lumbar radiculopathy. EMG & NCV Findings:  Sensory and motor nerve conduction studies (as indicated in the tables) were within reference of normal.    Disposable concentric needle EMG (as indicated in the table) was normal.      Impression: This study is normal. There is no electrodiagnostic evidence of a generalized neuropathy, myopathy or significant lumbar radiculopathy at this time.     Lu Lanza MD    Nerve Conduction Studies  Anti Sensory Summary Table     Site NR Peak (ms) Norm Peak (ms) P-T Amp (µV) Norm P-T Amp Site1 Site2 Dist (cm)   Left Sup Fibular Anti Sensory (Lat ankle)   Lower leg    2.8 <4.6 20.9 >4 Lower leg Lat ankle 10.0   Right Sup Fibular Anti Sensory (Lat ankle)   Lower leg    2.9 <4.6 17.1 >4 Lower leg Lat ankle 10.0   Right Sural Anti Sensory (Lat Mall)   Calf    4.2 <4.5 11.9 >4.0 Calf Lat Mall 14.0     Motor Summary Table     Site NR Onset (ms) Norm Onset (ms) O-P Amp (mV) Norm O-P Amp Neg Area% (1st) Site1 Site2 Dist (cm) Kaiser (m/s) Norm Kaiser (m/s)   Left Fibular Motor (Ext Dig Brev)   Ankle    5.3 <6.5 4.7 >1.1  Ankle Ext Dig Brev 8.0     B Fib    12.5  4.5   B Fib Ankle 32.0 44 >38   Poplt    14.1  4.2   Poplt B Fib 10.0 63 >42   Right Fibular Motor (Ext Dig Brev)   Ankle    4.2 <6.5 7.4 >1.1  Ankle Ext Dig Brev 8.0     B Fib    10.9  7.2   B Fib Ankle 28.0 42 >38   Poplt    12.7  6.7   Poplt B Fib 10.0 56 >42   Left Tibial Motor (Abd Lee Brev) Ankle    3.9 <6.1 11.2 >1.1  Ankle Abd Lee Brev 8.0     Knee    13.5  8.4   Knee Ankle 40.0 42 >39   Right Tibial Motor (Abd Lee Brev)   Ankle    3.9 <6.1 7.1 >1.1  Ankle Abd Lee Brev 8.0     Knee    13.4  5.2   Knee Ankle 40.0 42 >39     EMG     Side Muscle Nerve Root Ins Act Fibs Psw Recrt Amp Dur Poly Comment   Right VastusLat Femoral L2-4 Nml Nml Nml Nml Nml Nml 0    Right MedGastroc   Nml Nml Nml Nml Nml Nml 0    Right AntTibialis Dp Br Peron L4-5 Nml Nml Nml Nml Nml Nml 0    Right Peroneus Long Sup Br Peron L5-S1 Nml Nml Nml Nml Nml Nml 0    Right TensorFascLat SupGluteal L4-5, S1 Nml Nml Nml Nml Nml Nml 0    Right Lower Lumb Parasp   Nml Nml Nml Nml Nml Nml 0    Right Mid Lumb Parasp   Nml Nml Nml Nml Nml Nml 0    Left VastusLat Femoral L2-4 Nml Nml Nml Nml Nml Nml 0    Left MedGastroc   Nml Nml Nml Nml Nml Nml 0    Left AntTibialis Dp Br Peron L4-5 Nml Nml Nml Nml Nml Nml 0    Left Peroneus Long Sup Br Peron L5-S1 Nml Nml Nml Nml Nml Nml 0    Left TensorFascLat SupGluteal L4-5, S1 Nml Nml Nml Nml Nml Nml 0    Left Lower Lumb Parasp   Nml Nml Nml Nml Nml Nml 0    Left Mid Lumb Parasp   Nml Nml Nml Nml Nml Nml 0        Waveforms:

## 2023-05-09 DIAGNOSIS — I10 ESSENTIAL (PRIMARY) HYPERTENSION: Primary | ICD-10-CM

## 2023-05-22 ENCOUNTER — OFFICE VISIT (OUTPATIENT)
Age: 68
End: 2023-05-22
Payer: MEDICARE

## 2023-05-22 VITALS
BODY MASS INDEX: 30.25 KG/M2 | WEIGHT: 164.4 LBS | RESPIRATION RATE: 14 BRPM | SYSTOLIC BLOOD PRESSURE: 130 MMHG | HEIGHT: 62 IN | OXYGEN SATURATION: 98 % | DIASTOLIC BLOOD PRESSURE: 72 MMHG | HEART RATE: 69 BPM

## 2023-05-22 DIAGNOSIS — Z78.0 ASYMPTOMATIC MENOPAUSAL STATE: ICD-10-CM

## 2023-05-22 DIAGNOSIS — Z00.00 INITIAL MEDICARE ANNUAL WELLNESS VISIT: Primary | ICD-10-CM

## 2023-05-22 DIAGNOSIS — I10 ESSENTIAL HYPERTENSION: ICD-10-CM

## 2023-05-22 DIAGNOSIS — N18.31 TYPE 2 DIABETES MELLITUS WITH STAGE 3A CHRONIC KIDNEY DISEASE, WITHOUT LONG-TERM CURRENT USE OF INSULIN (HCC): ICD-10-CM

## 2023-05-22 DIAGNOSIS — Z23 NEED FOR PROPHYLACTIC VACCINATION AGAINST DIPHTHERIA-TETANUS-PERTUSSIS (DTP): ICD-10-CM

## 2023-05-22 DIAGNOSIS — R26.89 BALANCE PROBLEM: ICD-10-CM

## 2023-05-22 DIAGNOSIS — R41.89 IMPAIRED COGNITION: ICD-10-CM

## 2023-05-22 DIAGNOSIS — E11.22 TYPE 2 DIABETES MELLITUS WITH STAGE 3A CHRONIC KIDNEY DISEASE, WITHOUT LONG-TERM CURRENT USE OF INSULIN (HCC): ICD-10-CM

## 2023-05-22 PROBLEM — E11.29 TYPE 2 DIABETES MELLITUS WITH KIDNEY COMPLICATION, WITHOUT LONG-TERM CURRENT USE OF INSULIN (HCC): Status: ACTIVE | Noted: 2021-01-26

## 2023-05-22 LAB — HBA1C MFR BLD: 6 %

## 2023-05-22 PROCEDURE — 3078F DIAST BP <80 MM HG: CPT | Performed by: INTERNAL MEDICINE

## 2023-05-22 PROCEDURE — 1123F ACP DISCUSS/DSCN MKR DOCD: CPT | Performed by: INTERNAL MEDICINE

## 2023-05-22 PROCEDURE — G0438 PPPS, INITIAL VISIT: HCPCS | Performed by: INTERNAL MEDICINE

## 2023-05-22 PROCEDURE — 99214 OFFICE O/P EST MOD 30 MIN: CPT | Performed by: INTERNAL MEDICINE

## 2023-05-22 PROCEDURE — 83036 HEMOGLOBIN GLYCOSYLATED A1C: CPT | Performed by: INTERNAL MEDICINE

## 2023-05-22 PROCEDURE — 3075F SYST BP GE 130 - 139MM HG: CPT | Performed by: INTERNAL MEDICINE

## 2023-05-22 RX ORDER — DESVENLAFAXINE 25 MG/1
25 TABLET, EXTENDED RELEASE ORAL DAILY
COMMUNITY

## 2023-05-22 ASSESSMENT — PATIENT HEALTH QUESTIONNAIRE - PHQ9
2. FEELING DOWN, DEPRESSED OR HOPELESS: 1
SUM OF ALL RESPONSES TO PHQ QUESTIONS 1-9: 2
1. LITTLE INTEREST OR PLEASURE IN DOING THINGS: 1
SUM OF ALL RESPONSES TO PHQ QUESTIONS 1-9: 2
SUM OF ALL RESPONSES TO PHQ9 QUESTIONS 1 & 2: 2
SUM OF ALL RESPONSES TO PHQ QUESTIONS 1-9: 2
SUM OF ALL RESPONSES TO PHQ QUESTIONS 1-9: 2

## 2023-05-22 ASSESSMENT — ENCOUNTER SYMPTOMS
SHORTNESS OF BREATH: 0
VOMITING: 0
ABDOMINAL PAIN: 0
CONSTIPATION: 0
DIARRHEA: 0
NAUSEA: 0

## 2023-05-22 ASSESSMENT — LIFESTYLE VARIABLES
HOW OFTEN DO YOU HAVE A DRINK CONTAINING ALCOHOL: NEVER
HOW MANY STANDARD DRINKS CONTAINING ALCOHOL DO YOU HAVE ON A TYPICAL DAY: PATIENT DOES NOT DRINK

## 2023-05-22 NOTE — ASSESSMENT & PLAN NOTE
Gait issues possibly neuropathy, EMG Ordered - normal  EEG ordered  Previously talked about statin, she previously reported she did stop it, but now she's currently taking it, hasn't noticed as much cognition issues   Follow up with neuro

## 2023-05-22 NOTE — ASSESSMENT & PLAN NOTE
Well-controlled, continue current medications   chlorthalidone 50, hydralazine 50 TID, bystolic 10, spironolactone 100  Previous medications:  Amlodipine (ankle swelling at 2.5mg); coreg (switched to bystolic to see if that worked better), lasix (switched to spironolactone for better bp control)  Contraindications: ACEi (angioedema);  ARBs (due to ACEi allergy)

## 2023-05-22 NOTE — ASSESSMENT & PLAN NOTE
Well-controlled, continue current medications   meformin  BID  a1c today 6%, down from 7.2%  Eye exam scheduled in July  Previous medications: Ozempic (hypoglycemia)

## 2023-05-22 NOTE — PROGRESS NOTES
Assessment and Plan     1. Initial Medicare annual wellness visit  Assessment & Plan:  DEXA ordered  Discussed TDAP  2. Asymptomatic menopausal state  -     DEXA Bone Density Axial Skeleton; Future  3. Need for prophylactic vaccination against diphtheria-tetanus-pertussis (DTP)  -     Tdap (ADACEL) 5-2-15.5 LF-MCG/0.5 injection; Inject 0.5 mLs into the muscle once for 1 dose, Disp-0.5 mL, R-0Print  4. Balance problem  Assessment & Plan:   Gait issues possibly neuropathy, EMG Ordered - normal  EEG ordered  Follow up with neuro  5. Impaired cognition  Assessment & Plan:  Gait issues possibly neuropathy, EMG Ordered - normal  EEG ordered  Previously talked about statin, she previously reported she did stop it, but now she's currently taking it, hasn't noticed as much cognition issues   Follow up with neuro  6. Type 2 diabetes mellitus with stage 3a chronic kidney disease, without long-term current use of insulin ([facility])  Assessment & Plan:   Well-controlled, continue current medications   meformin  BID  a1c today 6%, down from 7.2%  Eye exam scheduled in July  Previous medications: Ozempic (hypoglycemia)  Orders:  -     AMB POC HEMOGLOBIN A1C  -      DIABETES FOOT EXAM  7. Essential hypertension  Assessment & Plan:   Well-controlled, continue current medications   chlorthalidone 50, hydralazine 50 TID, bystolic 10, spironolactone 100  Previous medications:  Amlodipine (ankle swelling at 2.5mg); coreg (switched to bystolic to see if that worked better), lasix (switched to spironolactone for better bp control)  Contraindications: ACEi (angioedema); ARBs (due to ACEi allergy)          Benefits, risks, possible drug interactions, and side effects of all new medications were reviewed with the patient. Pt verbalized understanding. Return to clinic:  as needed    An electronic signature was used to authenticate this note.   Yuli Joyner MD  [facility]  5/22/2023    Future

## 2023-05-23 ENCOUNTER — OFFICE VISIT (OUTPATIENT)
Age: 68
End: 2023-05-23

## 2023-05-23 VITALS
SYSTOLIC BLOOD PRESSURE: 126 MMHG | HEIGHT: 62 IN | BODY MASS INDEX: 30.33 KG/M2 | DIASTOLIC BLOOD PRESSURE: 61 MMHG | OXYGEN SATURATION: 98 % | WEIGHT: 164.8 LBS | HEART RATE: 61 BPM

## 2023-05-23 DIAGNOSIS — G47.33 OSA (OBSTRUCTIVE SLEEP APNEA): Primary | ICD-10-CM

## 2023-05-23 ASSESSMENT — SLEEP AND FATIGUE QUESTIONNAIRES
HOW LIKELY ARE YOU TO NOD OFF OR FALL ASLEEP WHILE SITTING INACTIVE IN A PUBLIC PLACE: 0
HOW LIKELY ARE YOU TO NOD OFF OR FALL ASLEEP WHILE WATCHING TV: 2
ESS TOTAL SCORE: 8
HOW LIKELY ARE YOU TO NOD OFF OR FALL ASLEEP WHILE SITTING AND TALKING TO SOMEONE: 0
HOW LIKELY ARE YOU TO NOD OFF OR FALL ASLEEP WHILE SITTING QUIETLY AFTER LUNCH WITHOUT ALCOHOL: 0
HOW LIKELY ARE YOU TO NOD OFF OR FALL ASLEEP WHILE LYING DOWN TO REST IN THE AFTERNOON WHEN CIRCUMSTANCES PERMIT: 3
HOW LIKELY ARE YOU TO NOD OFF OR FALL ASLEEP WHILE SITTING AND READING: 0
NECK CIRCUMFERENCE (INCHES): 16
HOW LIKELY ARE YOU TO NOD OFF OR FALL ASLEEP WHEN YOU ARE A PASSENGER IN A CAR FOR AN HOUR WITHOUT A BREAK: 3
HOW LIKELY ARE YOU TO NOD OFF OR FALL ASLEEP IN A CAR, WHILE STOPPED FOR A FEW MINUTES IN TRAFFIC: 0

## 2023-05-23 NOTE — PROGRESS NOTES
[place] [id number]  [address] [place] [address]  Iggy [address]que[address] [place] [address]  Dept: [phone]           [address] [place], [address]  Tel.  [phone]  Fax. [address] [facility] [address]  [place], [address] [facility]  Tel.  [phone]  Fax. [phone] [address] [place] [address]  Tel.  [phone]  Fax. [phone]       Chief Complaint       Chief Complaint   Patient presents with    Sleep Problem     NP refd by Rachna Baker MD for sleep consult       HPI      Juan Nolan is 78 y.o. female seen for evaluation of a sleep disorder. The patient reports she underwent an initial evaluation years ago when she was seen by Dr. Danette Valverde. She was never provided a copy of her sleep study. She was started on APAP 5 to 15 cm. Compliance demonstrates that APAP use during 30/30 days; average use 8.1 hours. CMS compliance 100%. AHI 0.3/h. Patient has lost approximately 30 pounds since the initial evaluation. Currently retires 11 PM and will awaken at 9 AM.  Denies fatigue on awakening. Does nap between 2-3 PM.     At times may doze if seated and an active such as when watching TV or riding as a passenger.         Sleep Medicine 5/23/2023   Sitting and reading 0   Watching TV 2   Sitting, inactive in a public place (e.g. a theatre or a meeting) 0   As a passenger in a car for an hour without a break 3   Lying down to rest in the afternoon when circumstances permit 3   Sitting and talking to someone 0   Sitting quietly after a lunch without alcohol 0   In a car, while stopped for a few minutes in traffic 0   Austin Sleepiness Score 8   Neck circumference (Inches) 16        Allergies   Allergen Reactions    Ace Inhibitors Anaphylaxis and Angioedema     Use an epi pen     Lisinopril      Other reaction(s): lip swelling  Other reaction(s): lip swelling           Current Outpatient

## 2023-06-01 DIAGNOSIS — E61.1 IRON DEFICIENCY: ICD-10-CM

## 2023-06-01 DIAGNOSIS — E11.9 TYPE 2 DIABETES MELLITUS WITHOUT COMPLICATIONS (HCC): ICD-10-CM

## 2023-06-01 RX ORDER — METFORMIN HYDROCHLORIDE 500 MG/1
TABLET, EXTENDED RELEASE ORAL
Qty: 180 TABLET | Refills: 3 | Status: SHIPPED | OUTPATIENT
Start: 2023-06-01

## 2023-06-01 RX ORDER — FERROUS SULFATE 325(65) MG
TABLET ORAL
Qty: 90 TABLET | Refills: 3 | Status: SHIPPED | OUTPATIENT
Start: 2023-06-01

## 2023-06-19 ENCOUNTER — TELEPHONE (OUTPATIENT)
Age: 68
End: 2023-06-19

## 2023-06-19 NOTE — TELEPHONE ENCOUNTER
PSG performed for potential sleep disordered breathing.  518.3 minutes recorded of which 359.8 spent asleep with a sleep efficiency of 69.4%. Sleep onset at 129.5 minutes; REM onset at 104.5 minutes with total REM representing 15% of sleep time. All sleep stages observed. 23 respiratory events occurred at which 14 hypopnea and 9 obstructive apnea. Overall AHI 3.8/h.  REM related AHI 10/h. Patient supine and lateral.  Minimal SaO2 91%. Moderate snoring noted. Impression: PSG did not demonstrate significant sleep disordered breathing. Sleep technologist: Please advise patient of study results. At present, continuing CPAP would not be indicated.

## 2023-06-21 PROBLEM — Z00.00 INITIAL MEDICARE ANNUAL WELLNESS VISIT: Status: RESOLVED | Noted: 2021-05-26 | Resolved: 2023-06-21

## 2023-06-22 ENCOUNTER — HOSPITAL ENCOUNTER (OUTPATIENT)
Facility: HOSPITAL | Age: 68
Discharge: HOME | End: 2023-06-22
Attending: INTERNAL MEDICINE
Payer: MEDICARE

## 2023-06-22 DIAGNOSIS — Z78.0 ASYMPTOMATIC MENOPAUSAL STATE: ICD-10-CM

## 2023-06-22 PROCEDURE — 77080 DXA BONE DENSITY AXIAL: CPT

## 2023-07-19 ENCOUNTER — OFFICE VISIT (OUTPATIENT)
Age: 68
End: 2023-07-19
Payer: MEDICARE

## 2023-07-19 VITALS
WEIGHT: 163 LBS | DIASTOLIC BLOOD PRESSURE: 66 MMHG | BODY MASS INDEX: 30 KG/M2 | HEIGHT: 62 IN | HEART RATE: 65 BPM | SYSTOLIC BLOOD PRESSURE: 115 MMHG

## 2023-07-19 DIAGNOSIS — M85.89 OSTEOPENIA OF MULTIPLE SITES: ICD-10-CM

## 2023-07-19 DIAGNOSIS — E83.52 HYPERCALCEMIA: Primary | ICD-10-CM

## 2023-07-19 DIAGNOSIS — N18.30 STAGE 3 CHRONIC KIDNEY DISEASE DUE TO BENIGN HYPERTENSION (HCC): ICD-10-CM

## 2023-07-19 DIAGNOSIS — I12.9 STAGE 3 CHRONIC KIDNEY DISEASE DUE TO BENIGN HYPERTENSION (HCC): ICD-10-CM

## 2023-07-19 PROCEDURE — 3078F DIAST BP <80 MM HG: CPT | Performed by: INTERNAL MEDICINE

## 2023-07-19 PROCEDURE — 1123F ACP DISCUSS/DSCN MKR DOCD: CPT | Performed by: INTERNAL MEDICINE

## 2023-07-19 PROCEDURE — 99204 OFFICE O/P NEW MOD 45 MIN: CPT | Performed by: INTERNAL MEDICINE

## 2023-07-19 PROCEDURE — 3074F SYST BP LT 130 MM HG: CPT | Performed by: INTERNAL MEDICINE

## 2023-07-19 NOTE — PROGRESS NOTES
Chief Complaint   Patient presents with    Other     Hypercalcemia         General:    Referred by Nephrology for elevated calcium     Meds:  OTC Ca: was not on any, but after BMD started - 2 weeks ago   Vit D: yes - only 2 weeks -normal past winter  HCTZ: no  Lithium: no    Kidney Stones: none  Bone loss: osteopenia    Symptoms:  Weakness: no  Bone pain: no  Confusion: no  Lethargy: no  Low concentration: no  GERD: yes - mild on med  Constipation: no  Low appetite no  Nausea/Vomiting: no  Polyuria/Polydipsia: no    Family history:  Kidney stones - no  Bone loss - no (mom broke fingers from falls)  Fractures - no  Calcium issues - no    Labs/Studies    11/15/22 GFR 39%, Ca 10.4, Phos 4.5-- PTH panel Ca 10.7, PTH 10.7, Vit D 49  5/22/23 GFR 41%, Ca 10.5, alb 4.7, PTH 29    12/5/22 Parathyroid Scan: No scintigraphically apparent parathyroid adenoma  12/5/22 Thyroid US: The thyroid gland exhibits minimal nonspecific heterogeneity without convincing nodule. No apparent parathyroid adenoma. Right lobe measures approximately 3.6 x 1.4 x 1.2 cm. Left lobe measures approximately 4.5 x 1.3 x 1.2 cm. No components found for: CILT*  Lab Results   Component Value Date/Time    CALCIUM 10.2 08/24/2022 12:56 PM    CALCIUM 9.6 06/07/2022 04:05 PM    CALCIUM 9.9 05/03/2022 11:48 AM    CALCIUM 9.9 08/03/2021 09:37 AM     No results found for: PTH  No results found for: VITD25  No results found for: PHOS  No results found for: CREAU    Lab Results   Component Value Date/Time    TSH 2.52 10/20/2022 02:30 PM         BMD  L Spine  L Hip Neck  L Hip Total  L Wrist  FRAX   6/23/23 -0.6 -0.2L,-1.2R +0.6L, -0.2R -0.6 10.7% 8.3%              No past medical history on file.      Past Surgical History:   Procedure Laterality Date    BREAST BIOPSY Right     GYN      DNC, another procedure for an abnormal pap (?LEEP)    TUBAL LIGATION Bilateral         Social History     Tobacco Use    Smoking status: Never    Smokeless tobacco: Never

## 2023-08-29 LAB
BUN SERPL-MCNC: 24 MG/DL (ref 8–27)
BUN/CREAT SERPL: 17 (ref 12–28)
CA-I SERPL ISE-MCNC: 5.4 MG/DL (ref 4.5–5.6)
CALCIUM SERPL-MCNC: 10.4 MG/DL (ref 8.7–10.3)
CHLORIDE SERPL-SCNC: 97 MMOL/L (ref 96–106)
CO2 SERPL-SCNC: 26 MMOL/L (ref 20–29)
CREAT SERPL-MCNC: 1.41 MG/DL (ref 0.57–1)
EGFRCR SERPLBLD CKD-EPI 2021: 41 ML/MIN/1.73
GLUCOSE SERPL-MCNC: 70 MG/DL (ref 70–99)
PHOSPHATE SERPL-MCNC: 3.6 MG/DL (ref 3–4.3)
POTASSIUM SERPL-SCNC: 4.8 MMOL/L (ref 3.5–5.2)
PTH-INTACT SERPL-MCNC: 42 PG/ML (ref 15–65)
SODIUM SERPL-SCNC: 137 MMOL/L (ref 134–144)

## 2023-09-21 ENCOUNTER — OFFICE VISIT (OUTPATIENT)
Facility: CLINIC | Age: 68
End: 2023-09-21

## 2023-09-21 VITALS
HEART RATE: 60 BPM | SYSTOLIC BLOOD PRESSURE: 142 MMHG | HEIGHT: 62 IN | WEIGHT: 163.8 LBS | OXYGEN SATURATION: 98 % | TEMPERATURE: 97.7 F | BODY MASS INDEX: 30.14 KG/M2 | RESPIRATION RATE: 16 BRPM | DIASTOLIC BLOOD PRESSURE: 58 MMHG

## 2023-09-21 DIAGNOSIS — Z53.21 PATIENT LEFT AFTER TRIAGE: Primary | ICD-10-CM

## 2023-10-04 ENCOUNTER — TELEMEDICINE (OUTPATIENT)
Facility: CLINIC | Age: 68
End: 2023-10-04
Payer: MEDICARE

## 2023-10-04 DIAGNOSIS — E83.52 HYPERCALCEMIA: ICD-10-CM

## 2023-10-04 DIAGNOSIS — I10 ESSENTIAL HYPERTENSION: Primary | ICD-10-CM

## 2023-10-04 DIAGNOSIS — E61.1 IRON DEFICIENCY: ICD-10-CM

## 2023-10-04 DIAGNOSIS — E11.22 TYPE 2 DIABETES MELLITUS WITH STAGE 3A CHRONIC KIDNEY DISEASE, WITHOUT LONG-TERM CURRENT USE OF INSULIN (HCC): ICD-10-CM

## 2023-10-04 DIAGNOSIS — E78.5 HYPERLIPIDEMIA, UNSPECIFIED HYPERLIPIDEMIA TYPE: ICD-10-CM

## 2023-10-04 DIAGNOSIS — Z76.89 ESTABLISHING CARE WITH NEW DOCTOR, ENCOUNTER FOR: ICD-10-CM

## 2023-10-04 DIAGNOSIS — I49.3 PVC (PREMATURE VENTRICULAR CONTRACTION): ICD-10-CM

## 2023-10-04 DIAGNOSIS — R26.89 BALANCE PROBLEM: ICD-10-CM

## 2023-10-04 DIAGNOSIS — N18.31 TYPE 2 DIABETES MELLITUS WITH STAGE 3A CHRONIC KIDNEY DISEASE, WITHOUT LONG-TERM CURRENT USE OF INSULIN (HCC): ICD-10-CM

## 2023-10-04 PROCEDURE — 99204 OFFICE O/P NEW MOD 45 MIN: CPT | Performed by: FAMILY MEDICINE

## 2023-10-04 PROCEDURE — 1123F ACP DISCUSS/DSCN MKR DOCD: CPT | Performed by: FAMILY MEDICINE

## 2023-10-04 RX ORDER — FLUOCINOLONE ACETONIDE, HYDROQUINONE, AND TRETINOIN .1; 40; .5 MG/G; MG/G; MG/G
CREAM TOPICAL
COMMUNITY
Start: 2023-09-27

## 2023-10-04 SDOH — ECONOMIC STABILITY: HOUSING INSECURITY
IN THE LAST 12 MONTHS, WAS THERE A TIME WHEN YOU DID NOT HAVE A STEADY PLACE TO SLEEP OR SLEPT IN A SHELTER (INCLUDING NOW)?: NO

## 2023-10-04 SDOH — ECONOMIC STABILITY: FOOD INSECURITY: WITHIN THE PAST 12 MONTHS, YOU WORRIED THAT YOUR FOOD WOULD RUN OUT BEFORE YOU GOT MONEY TO BUY MORE.: NEVER TRUE

## 2023-10-04 SDOH — ECONOMIC STABILITY: INCOME INSECURITY: HOW HARD IS IT FOR YOU TO PAY FOR THE VERY BASICS LIKE FOOD, HOUSING, MEDICAL CARE, AND HEATING?: NOT HARD AT ALL

## 2023-10-04 SDOH — ECONOMIC STABILITY: FOOD INSECURITY: WITHIN THE PAST 12 MONTHS, THE FOOD YOU BOUGHT JUST DIDN'T LAST AND YOU DIDN'T HAVE MONEY TO GET MORE.: NEVER TRUE

## 2023-10-04 ASSESSMENT — PATIENT HEALTH QUESTIONNAIRE - PHQ9
SUM OF ALL RESPONSES TO PHQ QUESTIONS 1-9: 0
SUM OF ALL RESPONSES TO PHQ9 QUESTIONS 1 & 2: 0
SUM OF ALL RESPONSES TO PHQ QUESTIONS 1-9: 0
SUM OF ALL RESPONSES TO PHQ QUESTIONS 1-9: 0
2. FEELING DOWN, DEPRESSED OR HOPELESS: 0
SUM OF ALL RESPONSES TO PHQ QUESTIONS 1-9: 0
1. LITTLE INTEREST OR PLEASURE IN DOING THINGS: 0

## 2023-10-04 NOTE — PROGRESS NOTES
Meghna Coats is a 80 y.o. female who was seen by synchronous (real-time) audio-video technology on 10/4/2023. Consent: Meghna Coats, who was seen by synchronous (real-time) audio-video technology, and/or her healthcare decision maker, is aware that this patient-initiated, Telehealth encounter on 10/4/2023 is a billable service, with coverage as determined by her insurance carrier. She is aware that she may receive a bill and has provided verbal consent to proceed: Yes. Assessment & Plan:      Diagnosis Orders   1. Essential hypertension  Lipid Panel    Microalbumin / Creatinine Urine Ratio    Hemoglobin A1C    CBC    Comprehensive Metabolic Panel      2. Type 2 diabetes mellitus with stage 3a chronic kidney disease, without long-term current use of insulin (HCC)  Lipid Panel    Microalbumin / Creatinine Urine Ratio    Hemoglobin A1C    CBC    Comprehensive Metabolic Panel      3. Hyperlipidemia, unspecified hyperlipidemia type  Lipid Panel    Microalbumin / Creatinine Urine Ratio    Hemoglobin A1C    CBC    Comprehensive Metabolic Panel      4. Balance problem        5. Iron deficiency        6. Hypercalcemia        7. PVC (premature ventricular contraction)        8.  Establishing care with new doctor, encounter for          New patient here today establishing care  Blood pressure appears to be at goal  Management is comanaged between PCP, cardiology and nephrology  Records request from cardiology  Labs, fasting per my orders  Diabetes with what appears to be optimal control at last check  Recheck labs per my orders  Hyperlipidemia with statin intolerance, we will recheck lipids and defer to cardiology if there are further concerns  Balance problem, pending follow-up with neurology  History of iron deficiency compliant with medication management  Hypercalcemia status post work-up, I did review this with patient today  PVCs and hyperal lipidemia and hypertension management with cardiology  Follow-up

## 2023-10-04 NOTE — PROGRESS NOTES
Chief Complaint   Patient presents with    Establish Care     1. Have you been to the ER, urgent care clinic since your last visit? Hospitalized since your last visit? No    2. Have you seen or consulted any other health care providers outside of the [address] [place] [address] since your last visit? Include any pap smears or colon screening.  No

## 2023-11-09 DIAGNOSIS — E78.5 HYPERLIPIDEMIA, UNSPECIFIED HYPERLIPIDEMIA TYPE: ICD-10-CM

## 2023-11-09 DIAGNOSIS — I10 ESSENTIAL HYPERTENSION: ICD-10-CM

## 2023-11-09 DIAGNOSIS — E11.22 TYPE 2 DIABETES MELLITUS WITH STAGE 3A CHRONIC KIDNEY DISEASE, WITHOUT LONG-TERM CURRENT USE OF INSULIN (HCC): ICD-10-CM

## 2023-11-09 DIAGNOSIS — N18.31 TYPE 2 DIABETES MELLITUS WITH STAGE 3A CHRONIC KIDNEY DISEASE, WITHOUT LONG-TERM CURRENT USE OF INSULIN (HCC): ICD-10-CM

## 2023-11-10 LAB
ALBUMIN SERPL-MCNC: 3.9 G/DL (ref 3.5–5)
ALBUMIN/GLOB SERPL: 1.1 (ref 1.1–2.2)
ALP SERPL-CCNC: 83 U/L (ref 45–117)
ALT SERPL-CCNC: 22 U/L (ref 12–78)
ANION GAP SERPL CALC-SCNC: 5 MMOL/L (ref 5–15)
AST SERPL-CCNC: 13 U/L (ref 15–37)
BILIRUB SERPL-MCNC: 0.3 MG/DL (ref 0.2–1)
BUN SERPL-MCNC: 29 MG/DL (ref 6–20)
BUN/CREAT SERPL: 22 (ref 12–20)
CALCIUM SERPL-MCNC: 9.7 MG/DL (ref 8.5–10.1)
CHLORIDE SERPL-SCNC: 100 MMOL/L (ref 97–108)
CHOLEST SERPL-MCNC: 221 MG/DL
CO2 SERPL-SCNC: 28 MMOL/L (ref 21–32)
CREAT SERPL-MCNC: 1.31 MG/DL (ref 0.55–1.02)
CREAT UR-MCNC: 91.6 MG/DL
ERYTHROCYTE [DISTWIDTH] IN BLOOD BY AUTOMATED COUNT: 14 % (ref 11.5–14.5)
EST. AVERAGE GLUCOSE BLD GHB EST-MCNC: 126 MG/DL
GLOBULIN SER CALC-MCNC: 3.6 G/DL (ref 2–4)
GLUCOSE SERPL-MCNC: 102 MG/DL (ref 65–100)
HBA1C MFR BLD: 6 % (ref 4–5.6)
HCT VFR BLD AUTO: 33.3 % (ref 35–47)
HDLC SERPL-MCNC: 53 MG/DL
HDLC SERPL: 4.2 (ref 0–5)
HGB BLD-MCNC: 10.6 G/DL (ref 11.5–16)
LDLC SERPL CALC-MCNC: 153.6 MG/DL (ref 0–100)
MCH RBC QN AUTO: 25.4 PG (ref 26–34)
MCHC RBC AUTO-ENTMCNC: 31.8 G/DL (ref 30–36.5)
MCV RBC AUTO: 79.9 FL (ref 80–99)
MICROALBUMIN UR-MCNC: 0.53 MG/DL
MICROALBUMIN/CREAT UR-RTO: 6 MG/G (ref 0–30)
NRBC # BLD: 0 K/UL (ref 0–0.01)
NRBC BLD-RTO: 0 PER 100 WBC
PLATELET # BLD AUTO: 278 K/UL (ref 150–400)
PMV BLD AUTO: 11 FL (ref 8.9–12.9)
POTASSIUM SERPL-SCNC: 4.8 MMOL/L (ref 3.5–5.1)
PROT SERPL-MCNC: 7.5 G/DL (ref 6.4–8.2)
RBC # BLD AUTO: 4.17 M/UL (ref 3.8–5.2)
SODIUM SERPL-SCNC: 133 MMOL/L (ref 136–145)
TRIGL SERPL-MCNC: 72 MG/DL
VLDLC SERPL CALC-MCNC: 14.4 MG/DL
WBC # BLD AUTO: 5.2 K/UL (ref 3.6–11)

## 2023-12-05 DIAGNOSIS — I10 ESSENTIAL (PRIMARY) HYPERTENSION: ICD-10-CM

## 2023-12-05 RX ORDER — CHLORTHALIDONE 50 MG/1
50 TABLET ORAL DAILY
Qty: 90 TABLET | Refills: 1 | Status: SHIPPED | OUTPATIENT
Start: 2023-12-05

## 2023-12-05 RX ORDER — CHLORTHALIDONE 50 MG/1
TABLET ORAL
Qty: 90 TABLET | Refills: 3 | OUTPATIENT
Start: 2023-12-05

## 2023-12-05 NOTE — TELEPHONE ENCOUNTER
Lab Results   Component Value Date     (L) 11/09/2023    K 4.8 11/09/2023     11/09/2023    CO2 28 11/09/2023    BUN 29 (H) 11/09/2023    CREATININE 1.31 (H) 11/09/2023    GLUCOSE 102 (H) 11/09/2023    CALCIUM 9.7 11/09/2023    PROT 7.5 11/09/2023    LABALBU 3.9 11/09/2023    BILITOT 0.3 11/09/2023    ALKPHOS 83 11/09/2023    AST 13 (L) 11/09/2023    ALT 22 11/09/2023    LABGLOM 45 (L) 11/09/2023    GFRAA 51 (L) 08/24/2022    AGRATIO 1.1 08/03/2021    GLOB 3.6 11/09/2023       Patient recently seen by nephrology

## 2023-12-12 ENCOUNTER — OFFICE VISIT (OUTPATIENT)
Facility: CLINIC | Age: 68
End: 2023-12-12
Payer: MEDICARE

## 2023-12-12 VITALS
DIASTOLIC BLOOD PRESSURE: 87 MMHG | TEMPERATURE: 97.2 F | BODY MASS INDEX: 30.47 KG/M2 | HEIGHT: 62 IN | HEART RATE: 63 BPM | OXYGEN SATURATION: 98 % | SYSTOLIC BLOOD PRESSURE: 120 MMHG | RESPIRATION RATE: 18 BRPM | WEIGHT: 165.6 LBS

## 2023-12-12 DIAGNOSIS — E11.22 TYPE 2 DIABETES MELLITUS WITH STAGE 3A CHRONIC KIDNEY DISEASE, WITHOUT LONG-TERM CURRENT USE OF INSULIN (HCC): ICD-10-CM

## 2023-12-12 DIAGNOSIS — I10 ESSENTIAL HYPERTENSION: ICD-10-CM

## 2023-12-12 DIAGNOSIS — E78.5 HYPERLIPIDEMIA, UNSPECIFIED HYPERLIPIDEMIA TYPE: ICD-10-CM

## 2023-12-12 DIAGNOSIS — D57.3 SICKLE CELL TRAIT (HCC): ICD-10-CM

## 2023-12-12 DIAGNOSIS — E61.1 IRON DEFICIENCY: ICD-10-CM

## 2023-12-12 DIAGNOSIS — N18.31 TYPE 2 DIABETES MELLITUS WITH STAGE 3A CHRONIC KIDNEY DISEASE, WITHOUT LONG-TERM CURRENT USE OF INSULIN (HCC): ICD-10-CM

## 2023-12-12 DIAGNOSIS — E83.52 HYPERCALCEMIA: ICD-10-CM

## 2023-12-12 DIAGNOSIS — M54.12 CERVICAL RADICULAR PAIN: Primary | ICD-10-CM

## 2023-12-12 PROCEDURE — 1123F ACP DISCUSS/DSCN MKR DOCD: CPT | Performed by: FAMILY MEDICINE

## 2023-12-12 PROCEDURE — 3044F HG A1C LEVEL LT 7.0%: CPT | Performed by: FAMILY MEDICINE

## 2023-12-12 PROCEDURE — 3074F SYST BP LT 130 MM HG: CPT | Performed by: FAMILY MEDICINE

## 2023-12-12 PROCEDURE — 99214 OFFICE O/P EST MOD 30 MIN: CPT | Performed by: FAMILY MEDICINE

## 2023-12-12 PROCEDURE — 3079F DIAST BP 80-89 MM HG: CPT | Performed by: FAMILY MEDICINE

## 2023-12-12 RX ORDER — ROSUVASTATIN CALCIUM 5 MG/1
5 TABLET, COATED ORAL DAILY
Qty: 90 TABLET | Refills: 1 | Status: SHIPPED | OUTPATIENT
Start: 2023-12-12

## 2023-12-12 SDOH — ECONOMIC STABILITY: FOOD INSECURITY: WITHIN THE PAST 12 MONTHS, THE FOOD YOU BOUGHT JUST DIDN'T LAST AND YOU DIDN'T HAVE MONEY TO GET MORE.: NEVER TRUE

## 2023-12-12 SDOH — ECONOMIC STABILITY: FOOD INSECURITY: WITHIN THE PAST 12 MONTHS, YOU WORRIED THAT YOUR FOOD WOULD RUN OUT BEFORE YOU GOT MONEY TO BUY MORE.: NEVER TRUE

## 2023-12-12 ASSESSMENT — PATIENT HEALTH QUESTIONNAIRE - PHQ9
2. FEELING DOWN, DEPRESSED OR HOPELESS: 1
SUM OF ALL RESPONSES TO PHQ9 QUESTIONS 1 & 2: 2
1. LITTLE INTEREST OR PLEASURE IN DOING THINGS: 1
SUM OF ALL RESPONSES TO PHQ QUESTIONS 1-9: 2

## 2023-12-12 NOTE — PATIENT INSTRUCTIONS
Check a cervical xr for radicular symptoms    Dm is well controlled  HTN is well controlled  Lipids not controlled--at statin at low low dose and see if tolerable    Hx sickle trait with anemia as well, adding iron    Ca is improved    Continue to see all your specialists    Follow up for [address] in may or sooner as needed

## 2023-12-12 NOTE — PROGRESS NOTES
Chief Complaint   Patient presents with    Discuss Labs    Discuss Medications     1. Have you been to the ER, urgent care clinic since your last visit? Hospitalized since your last visit? No    2. Have you seen or consulted any other health care providers outside of the [address] [place] [address] Avenue since your last visit? Include any pap smears or colon screening.  No

## 2023-12-12 NOTE — PROGRESS NOTES
Family Medicine Follow-Up Progress Note  Patient: Ashley Hendrix  1955, 80 y.o., female  Encounter Date: 12/12/2023     ASSESSMENT & PLAN    ICD-10-CM    1. Cervical radicular pain  M54.12 XR CERVICAL SPINE (2-3 VIEWS)      2. Essential hypertension  I10       3. Type 2 diabetes mellitus with stage 3a chronic kidney disease, without long-term current use of insulin (HCC)  E11.22 rosuvastatin (CRESTOR) 5 MG tablet    N18.31       4. Hyperlipidemia, unspecified hyperlipidemia type  E78.5       5. Sickle cell trait ([address])  D57.3       6. Iron deficiency  E61.1       7. Hypercalcemia  E83.52 rosuvastatin (CRESTOR) 5 MG tablet          Orders Placed This Encounter   Procedures    XR CERVICAL SPINE (2-3 VIEWS)     Standing Status:   Future     Standing Expiration Date:   12/12/2024     Order Specific Question:   Reason for exam:     Answer:   cervical radicular symptoms, chronic but worsening       Patient Instructions   Check a cervical xr for radicular symptoms    Dm is well controlled  HTN is well controlled  Lipids not controlled--at statin at low low dose and see if tolerable    Hx sickle trait with anemia as well, adding iron    Ca is improved    Continue to see all your specialists    Follow up for [address] in may or sooner as needed        [address]  Chief Complaint   Patient presents with    Discuss Labs    Discuss Medications       SUBJECTIVE  Ashley Hendrix is a 80 y.o. female presenting today for follow up    History of R ankle pain.  She had a fracture in the past, she sometimes has some numbness on the Right side too  She tells me there is anterior ankle pain    She tells me she has some numbing on the r arm that does come and go    She says she had a nerve check in the past (presumably EMG) and was told things were ok    Seen more recently by Dr Libra Cervantes  At that visit bp was 140/80 (today it is better)  She tells me she told him she was worried about her diet/appetite    She consistently takes

## 2024-01-17 ENCOUNTER — OFFICE VISIT (OUTPATIENT)
Facility: CLINIC | Age: 69
End: 2024-01-17
Payer: MEDICARE

## 2024-01-17 VITALS
SYSTOLIC BLOOD PRESSURE: 138 MMHG | DIASTOLIC BLOOD PRESSURE: 64 MMHG | HEIGHT: 62 IN | TEMPERATURE: 97.9 F | WEIGHT: 167 LBS | RESPIRATION RATE: 16 BRPM | OXYGEN SATURATION: 97 % | BODY MASS INDEX: 30.73 KG/M2 | HEART RATE: 68 BPM

## 2024-01-17 DIAGNOSIS — J04.0 LARYNGITIS, ACUTE: Primary | ICD-10-CM

## 2024-01-17 DIAGNOSIS — U07.1 COVID-19: ICD-10-CM

## 2024-01-17 LAB
EXP DATE SOLUTION: ABNORMAL
EXP DATE SWAB: ABNORMAL
EXPIRATION DATE: ABNORMAL
LOT NUMBER POC: ABNORMAL
LOT NUMBER SOLUTION: ABNORMAL
LOT NUMBER SWAB: ABNORMAL
QUICKVUE INFLUENZA TEST: NEGATIVE
SARS-COV-2 RNA, POC: POSITIVE
VALID INTERNAL CONTROL, POC: YES

## 2024-01-17 PROCEDURE — 3078F DIAST BP <80 MM HG: CPT | Performed by: FAMILY MEDICINE

## 2024-01-17 PROCEDURE — 99213 OFFICE O/P EST LOW 20 MIN: CPT | Performed by: FAMILY MEDICINE

## 2024-01-17 PROCEDURE — 3075F SYST BP GE 130 - 139MM HG: CPT | Performed by: FAMILY MEDICINE

## 2024-01-17 PROCEDURE — 87804 INFLUENZA ASSAY W/OPTIC: CPT | Performed by: FAMILY MEDICINE

## 2024-01-17 PROCEDURE — 87635 SARS-COV-2 COVID-19 AMP PRB: CPT | Performed by: FAMILY MEDICINE

## 2024-01-17 PROCEDURE — 1123F ACP DISCUSS/DSCN MKR DOCD: CPT | Performed by: FAMILY MEDICINE

## 2024-01-17 RX ORDER — METFORMIN HYDROCHLORIDE 500 MG/1
500 TABLET, EXTENDED RELEASE ORAL 2 TIMES DAILY
COMMUNITY
Start: 2024-01-08

## 2024-01-17 RX ORDER — GUAIFENESIN 600 MG/1
600 TABLET, EXTENDED RELEASE ORAL 2 TIMES DAILY
Qty: 30 TABLET | Refills: 0 | Status: SHIPPED | OUTPATIENT
Start: 2024-01-17 | End: 2024-02-01

## 2024-01-17 RX ORDER — ALBUTEROL SULFATE 90 UG/1
2 AEROSOL, METERED RESPIRATORY (INHALATION) 4 TIMES DAILY PRN
Qty: 18 G | Refills: 0 | Status: SHIPPED | OUTPATIENT
Start: 2024-01-17

## 2024-01-17 ASSESSMENT — PATIENT HEALTH QUESTIONNAIRE - PHQ9
5. POOR APPETITE OR OVEREATING: 0
3. TROUBLE FALLING OR STAYING ASLEEP: 0
8. MOVING OR SPEAKING SO SLOWLY THAT OTHER PEOPLE COULD HAVE NOTICED. OR THE OPPOSITE, BEING SO FIGETY OR RESTLESS THAT YOU HAVE BEEN MOVING AROUND A LOT MORE THAN USUAL: 0
SUM OF ALL RESPONSES TO PHQ QUESTIONS 1-9: 0
SUM OF ALL RESPONSES TO PHQ QUESTIONS 1-9: 0
7. TROUBLE CONCENTRATING ON THINGS, SUCH AS READING THE NEWSPAPER OR WATCHING TELEVISION: 0
1. LITTLE INTEREST OR PLEASURE IN DOING THINGS: 0
SUM OF ALL RESPONSES TO PHQ9 QUESTIONS 1 & 2: 0
SUM OF ALL RESPONSES TO PHQ QUESTIONS 1-9: 0
10. IF YOU CHECKED OFF ANY PROBLEMS, HOW DIFFICULT HAVE THESE PROBLEMS MADE IT FOR YOU TO DO YOUR WORK, TAKE CARE OF THINGS AT HOME, OR GET ALONG WITH OTHER PEOPLE: 0
4. FEELING TIRED OR HAVING LITTLE ENERGY: 0
SUM OF ALL RESPONSES TO PHQ QUESTIONS 1-9: 0
2. FEELING DOWN, DEPRESSED OR HOPELESS: 0
6. FEELING BAD ABOUT YOURSELF - OR THAT YOU ARE A FAILURE OR HAVE LET YOURSELF OR YOUR FAMILY DOWN: 0
9. THOUGHTS THAT YOU WOULD BE BETTER OFF DEAD, OR OF HURTING YOURSELF: 0

## 2024-01-17 ASSESSMENT — ENCOUNTER SYMPTOMS
NAUSEA: 0
COUGH: 1
ABDOMINAL PAIN: 0
SORE THROAT: 1

## 2024-01-17 NOTE — PROGRESS NOTES
Patient's identity has been verified with 2 identifiers (Name and )    Chief Complaint   Patient presents with    Chest Congestion     Deidra Noonan is a 68 y.o. female who presents for a sick visit related to chest congestion.         Health Maintenance Due   Topic    DTaP/Tdap/Td vaccine (1 - Tdap)    Respiratory Syncytial Virus (RSV) Pregnant or age 60 yrs+ (1 - 1-dose 60+ series)    Flu vaccine (1)    COVID-19 Vaccine (2023- season)    Breast cancer screen        Wt Readings from Last 3 Encounters:   24 75.8 kg (167 lb)   23 75.1 kg (165 lb 9.6 oz)   23 74.3 kg (163 lb 12.8 oz)     Temp Readings from Last 3 Encounters:   23 97.2 °F (36.2 °C) (Temporal)   23 97.7 °F (36.5 °C)   23 97.1 °F (36.2 °C) (Skin)     BP Readings from Last 3 Encounters:   23 120/87   23 (!) 142/58   23 115/66     Pulse Readings from Last 3 Encounters:   23 63   23 60   23 65           Coordination of Care Questionnaire:  :   1. \"Have you been to the ER, urgent care clinic since your last visit?  Hospitalized since your last visit?\" no    2. \"Have you seen or consulted any other health care providers outside of the [facility] since your last visit?\" no     3. For patients aged 45-75: Has the patient had a colonoscopy / FIT/ Cologuard? yes    If the patient is female:    1. For patients aged 40-74: Has the patient had a mammogram within the past 2 years? yes    2. For patients aged 21-65: Has the patient had a pap smear? N/a     Do you have an Advance Directive on file? no  Are you interested in receiving information about Advance Directives? no    Patient is accompanied by self. I have received verbal consent from Deidra Noonan to discuss any/all medical information while they are present in the room.

## 2024-01-17 NOTE — PROGRESS NOTES
Assessment/Plan:     Diagnoses and all orders for this visit:    1. Laryngitis, acute  Pt positive for COVID today; discussed laryngitis related to viral process. Despite severity of aphonia, patient with diabetes and would prefer to avoid sugar dysregulation. Will send albuterol neb and advised to rinse mouth after use. Will send mucinex for use with copious hydration. Advised about neti pot versus saline spray and need for warm tea, humidification, hydration.   Symptoms progressing as expected, reviewed isolation, hand hygiene. Negative for acute/severe COVID symptoms today. Pt agrees to reach out if symptoms persist or worsen. Past window for Paxlovid.   - AMB POC RAPID INFLUENZA TEST  - AMB POC COVID-19 COV  - guaiFENesin (MUCINEX) 600 MG extended release tablet; Take 1 tablet by mouth 2 times daily for 15 days  Dispense: 30 tablet; Refill: 0  - albuterol sulfate HFA (VENTOLIN HFA) 108 (90 Base) MCG/ACT inhaler; Inhale 2 puffs into the lungs 4 times daily as needed for Wheezing Rinse mouth with water after each use  Dispense: 18 g; Refill: 0    2. COVID-19  Test positive today, reviewed findings and course of healing. Encouraged to return to clinic if symptoms persist or worsen.        Return in about 6 months (around 7/17/2024) for Diabetes.       Discussed expected course/resolution/complications of diagnosis in detail with patient.    Medication risks/benefits/costs/interactions/alternatives discussed with patient.    Pt expressed understanding with the diagnosis and plan    Portions of this note may have been populated using smart dictation software and may have \"sounds-like\" errors present.     Monique Stewart, APRN - CNP  [address]  [address] [place] [address]  Tel: [phone]  Fax: [phone]    Subjective:      CC  eDidra Noonan is a 68 y.o. female who presents for had concerns including Chest Congestion (Deidra Noonan is a 68 y.o. female

## 2024-01-17 NOTE — PATIENT INSTRUCTIONS
Acute laryngitis resolves without specific treatment other than hydration, humidification, and voice rest.     How can you care for yourself at home?  Rest your voice. You do not have to stop speaking, but use your voice as little as possible. Speak softly but do not whisper; whispering can bother your larynx more than speaking softly. Avoid talking on the telephone or trying to speak loudly.  Drink plenty of water to keep your throat moist.  Before you use cough and cold medicines, check the label. They may not be safe for young children or for people with certain health problems.  Try to keep stomach acid from backing up into your throat. Do not eat just before bedtime. Reduce the amount of coffee and alcohol you drink, and eat healthy foods. Taking over-the-counter acid reducers can help when these steps are not enough. In some cases, you may need prescription medicine.  Try not to clear your throat. This can cause more irritation of your larynx. Take an over-the-counter cough suppressant (if your doctor recommends it) if you have a dry cough that does not produce mucus.  Use saline (saltwater) nasal washes to help keep your nasal passages open and wash out mucus and allergens. You can buy saline nose sprays at a grocery store or drugstore. Follow the instructions on the package. Or you can make your own at home. Add 1 teaspoon of non-iodized salt and 1 teaspoon of baking soda to 2 cups of distilled or boiled and cooled water. Fill a squeeze bottle or neti pot with the nasal wash. Then insert the tip into your nostril, and lean over the sink. With your mouth open, gently squirt the liquid. Repeat on the other side.    Most people with laryngitis get better on their own within 2 to 3 weeks. If your voice is hoarse or gone for 2 weeks or longer, and you do not seem to be getting better, see a doctor or nurse.  You should also see a doctor or nurse if you have a sore throat and:  ?You have a fever of at least 101°F

## 2024-01-24 ENCOUNTER — PATIENT MESSAGE (OUTPATIENT)
Facility: CLINIC | Age: 69
End: 2024-01-24

## 2024-01-24 DIAGNOSIS — J01.90 ACUTE BACTERIAL SINUSITIS: Primary | ICD-10-CM

## 2024-01-24 DIAGNOSIS — B96.89 ACUTE BACTERIAL SINUSITIS: Primary | ICD-10-CM

## 2024-01-24 RX ORDER — AMOXICILLIN AND CLAVULANATE POTASSIUM 875; 125 MG/1; MG/1
1 TABLET, FILM COATED ORAL 2 TIMES DAILY
Qty: 14 TABLET | Refills: 0 | Status: SHIPPED | OUTPATIENT
Start: 2024-01-24 | End: 2024-01-31

## 2024-02-27 ENCOUNTER — OFFICE VISIT (OUTPATIENT)
Facility: CLINIC | Age: 69
End: 2024-02-27
Payer: MEDICARE

## 2024-02-27 VITALS
DIASTOLIC BLOOD PRESSURE: 69 MMHG | TEMPERATURE: 97.2 F | WEIGHT: 165 LBS | OXYGEN SATURATION: 98 % | SYSTOLIC BLOOD PRESSURE: 137 MMHG | HEART RATE: 62 BPM | HEIGHT: 62 IN | BODY MASS INDEX: 30.36 KG/M2

## 2024-02-27 DIAGNOSIS — F33.0 MAJOR DEPRESSIVE DISORDER, RECURRENT, MILD (HCC): ICD-10-CM

## 2024-02-27 DIAGNOSIS — F51.02 ADJUSTMENT INSOMNIA: Primary | ICD-10-CM

## 2024-02-27 PROBLEM — D57.3 SICKLE CELL TRAIT (HCC): Status: ACTIVE | Noted: 2024-02-27

## 2024-02-27 PROCEDURE — 3075F SYST BP GE 130 - 139MM HG: CPT | Performed by: FAMILY MEDICINE

## 2024-02-27 PROCEDURE — 99214 OFFICE O/P EST MOD 30 MIN: CPT | Performed by: FAMILY MEDICINE

## 2024-02-27 PROCEDURE — 3078F DIAST BP <80 MM HG: CPT | Performed by: FAMILY MEDICINE

## 2024-02-27 PROCEDURE — 1123F ACP DISCUSS/DSCN MKR DOCD: CPT | Performed by: FAMILY MEDICINE

## 2024-02-27 RX ORDER — DESVENLAFAXINE SUCCINATE 50 MG/1
50 TABLET, EXTENDED RELEASE ORAL EVERY MORNING
COMMUNITY
Start: 2024-02-12

## 2024-02-27 RX ORDER — KETOCONAZOLE 20 MG/G
CREAM TOPICAL
COMMUNITY
Start: 2024-02-05

## 2024-02-27 RX ORDER — HYDROXYZINE HYDROCHLORIDE 25 MG/1
25 TABLET, FILM COATED ORAL NIGHTLY PRN
Qty: 30 TABLET | Refills: 1 | Status: SHIPPED | OUTPATIENT
Start: 2024-02-27 | End: 2024-05-27

## 2024-02-27 RX ORDER — HYDRALAZINE HYDROCHLORIDE 25 MG/1
TABLET, FILM COATED ORAL
COMMUNITY
Start: 2024-02-12

## 2024-02-27 ASSESSMENT — PATIENT HEALTH QUESTIONNAIRE - PHQ9
2. FEELING DOWN, DEPRESSED OR HOPELESS: 3
SUM OF ALL RESPONSES TO PHQ QUESTIONS 1-9: 15
SUM OF ALL RESPONSES TO PHQ9 QUESTIONS 1 & 2: 6
9. THOUGHTS THAT YOU WOULD BE BETTER OFF DEAD, OR OF HURTING YOURSELF: 1
4. FEELING TIRED OR HAVING LITTLE ENERGY: 0
SUM OF ALL RESPONSES TO PHQ QUESTIONS 1-9: 15
10. IF YOU CHECKED OFF ANY PROBLEMS, HOW DIFFICULT HAVE THESE PROBLEMS MADE IT FOR YOU TO DO YOUR WORK, TAKE CARE OF THINGS AT HOME, OR GET ALONG WITH OTHER PEOPLE: 0
6. FEELING BAD ABOUT YOURSELF - OR THAT YOU ARE A FAILURE OR HAVE LET YOURSELF OR YOUR FAMILY DOWN: 3
7. TROUBLE CONCENTRATING ON THINGS, SUCH AS READING THE NEWSPAPER OR WATCHING TELEVISION: 0
1. LITTLE INTEREST OR PLEASURE IN DOING THINGS: 3
3. TROUBLE FALLING OR STAYING ASLEEP: 3
5. POOR APPETITE OR OVEREATING: 2
SUM OF ALL RESPONSES TO PHQ QUESTIONS 1-9: 14
SUM OF ALL RESPONSES TO PHQ QUESTIONS 1-9: 15
8. MOVING OR SPEAKING SO SLOWLY THAT OTHER PEOPLE COULD HAVE NOTICED. OR THE OPPOSITE, BEING SO FIGETY OR RESTLESS THAT YOU HAVE BEEN MOVING AROUND A LOT MORE THAN USUAL: 0

## 2024-02-27 ASSESSMENT — COLUMBIA-SUICIDE SEVERITY RATING SCALE - C-SSRS
1. WITHIN THE PAST MONTH, HAVE YOU WISHED YOU WERE DEAD OR WISHED YOU COULD GO TO SLEEP AND NOT WAKE UP?: YES
6. HAVE YOU EVER DONE ANYTHING, STARTED TO DO ANYTHING, OR PREPARED TO DO ANYTHING TO END YOUR LIFE?: NO
2. HAVE YOU ACTUALLY HAD ANY THOUGHTS OF KILLING YOURSELF?: NO

## 2024-02-27 NOTE — PATIENT INSTRUCTIONS
Seems to be adjustment insomnia  Recommend continue to work on counseling  Discussed sleep hygiene techniques  Can trial hydroxyzine prn at night  If needing more than that, would have you follow up with your psychiatrist

## 2024-02-27 NOTE — PROGRESS NOTES
Identified pt with two pt identifiers(name and ). Reviewed record in preparation for visit and have obtained necessary documentation. All patient medications has been reviewed.  Chief Complaint   Patient presents with    Follow-up    Sleep Problem     Pt is having issues with falling and staying asleep for about a month.       Vitals:    24 1258   BP: 137/69   Pulse: 62   Temp: 97.2 °F (36.2 °C)   SpO2: 98%                   Coordination of Care Questionnaire:   1) Have you been to an emergency room, urgent care, or hospitalized since your last visit?   no    2. Have seen or consulted any other health care provider since your last visit? no        
Worried About Running Out of Food in the Last Year: Never true     Ran Out of Food in the Last Year: Never true   Transportation Needs: Unknown (12/12/2023)    PRAPARE - Transportation     Lack of Transportation (Non-Medical): No   Physical Activity: Insufficiently Active (5/22/2023)    Exercise Vital Sign     Days of Exercise per Week: 3 days     Minutes of Exercise per Session: 30 min   Housing Stability: Unknown (12/12/2023)    Housing Stability Vital Sign     Unstable Housing in the Last Year: No     Allergies   Allergen Reactions    Ace Inhibitors Anaphylaxis and Angioedema     Use an epi pen     Lisinopril      Other reaction(s): lip swelling  Other reaction(s): lip swelling         LAB REVIEW    Lab Results   Component Value Date/Time     11/09/2023 10:26 AM    K 4.8 11/09/2023 10:26 AM     11/09/2023 10:26 AM    CO2 28 11/09/2023 10:26 AM    BUN 29 11/09/2023 10:26 AM    GFRAA 51 08/24/2022 12:56 PM    GLOB 3.6 11/09/2023 10:26 AM    ALT 22 11/09/2023 10:26 AM     Lab Results   Component Value Date/Time    WBC 5.2 11/09/2023 10:26 AM    HGB 10.6 11/09/2023 10:26 AM    HCT 33.3 11/09/2023 10:26 AM     11/09/2023 10:26 AM    MCV 79.9 11/09/2023 10:26 AM     Lab Results   Component Value Date/Time    UYQ6EGUN 6.0 05/22/2023 11:19 AM     Lab Results   Component Value Date/Time    CHOL 221 11/09/2023 10:26 AM    HDL 53 11/09/2023 10:26 AM           Annabella Fletcher MD  [facility]  02/27/24 1:35 PM    Portions of this note may have been populated using smart dictation software and may have \"sounds-like\" errors present.     Pt was counseled on risks, benefits and alternatives of treatment options. All questions were asked and answered and the patient was agreeable with the treatment plan as outlined.

## 2024-03-13 RX ORDER — PANTOPRAZOLE SODIUM 40 MG/1
40 TABLET, DELAYED RELEASE ORAL DAILY
Qty: 90 TABLET | Refills: 2 | Status: SHIPPED | OUTPATIENT
Start: 2024-03-13

## 2024-03-13 NOTE — TELEPHONE ENCOUNTER
Faxed request from [facility] pharmacy    PANTOPRAZOLE DR 40 MG TAB    QTY 90       Take one tablet by mouth every day

## 2024-03-18 ENCOUNTER — PATIENT MESSAGE (OUTPATIENT)
Facility: CLINIC | Age: 69
End: 2024-03-18

## 2024-03-18 DIAGNOSIS — I10 ESSENTIAL (PRIMARY) HYPERTENSION: ICD-10-CM

## 2024-03-18 RX ORDER — NEBIVOLOL 10 MG/1
TABLET ORAL
Qty: 90 TABLET | Refills: 3 | OUTPATIENT
Start: 2024-03-18

## 2024-03-19 ENCOUNTER — TELEPHONE (OUTPATIENT)
Facility: CLINIC | Age: 69
End: 2024-03-19

## 2024-03-19 RX ORDER — NEBIVOLOL 10 MG/1
10 TABLET ORAL DAILY
Qty: 90 TABLET | Refills: 0 | OUTPATIENT
Start: 2024-03-19 | End: 2024-06-17

## 2024-03-19 RX ORDER — NEBIVOLOL 10 MG/1
10 TABLET ORAL DAILY
Qty: 90 TABLET | Refills: 2 | Status: SHIPPED | OUTPATIENT
Start: 2024-03-19

## 2024-03-19 NOTE — TELEPHONE ENCOUNTER
Pt has called about why the nebivolol has not been refilled. She is out of medication and has been for several days. Looks like the pantoprazole was filled but not the nebivolol   Please advise patient

## 2024-03-19 NOTE — TELEPHONE ENCOUNTER
From: Deidra Noonan  To: Dr. Annabella Fletcher  Sent: 3/18/2024 9:30 AM EDT  Subject: Prescription Renewal     [facility] sent several requests last week for a refill on my Nebivolol 10mg tablets. Please submit a refill. I don’t have any more. Thanks!

## 2024-05-15 ENCOUNTER — TELEPHONE (OUTPATIENT)
Age: 69
End: 2024-05-15

## 2024-05-15 NOTE — TELEPHONE ENCOUNTER
Medication refill for     spironolactone (ALDACTONE) 100 MG tablet     [place], [place] - [address] - P [phone] - F [phone] [[id number]]

## 2024-05-16 RX ORDER — SPIRONOLACTONE 100 MG/1
TABLET, FILM COATED ORAL
Qty: 90 TABLET | Refills: 0 | Status: SHIPPED | OUTPATIENT
Start: 2024-05-16

## 2024-05-28 DIAGNOSIS — E83.52 HYPERCALCEMIA: ICD-10-CM

## 2024-05-28 DIAGNOSIS — N18.31 TYPE 2 DIABETES MELLITUS WITH STAGE 3A CHRONIC KIDNEY DISEASE, WITHOUT LONG-TERM CURRENT USE OF INSULIN (HCC): ICD-10-CM

## 2024-05-28 DIAGNOSIS — E11.22 TYPE 2 DIABETES MELLITUS WITH STAGE 3A CHRONIC KIDNEY DISEASE, WITHOUT LONG-TERM CURRENT USE OF INSULIN (HCC): ICD-10-CM

## 2024-05-28 RX ORDER — ROSUVASTATIN CALCIUM 5 MG/1
5 TABLET, COATED ORAL DAILY
Qty: 90 TABLET | Refills: 1 | OUTPATIENT
Start: 2024-05-28

## 2024-05-30 RX ORDER — CHLORTHALIDONE 50 MG/1
50 TABLET ORAL DAILY
Qty: 90 TABLET | Refills: 1 | OUTPATIENT
Start: 2024-05-30

## 2024-06-05 ENCOUNTER — PATIENT MESSAGE (OUTPATIENT)
Facility: CLINIC | Age: 69
End: 2024-06-05

## 2024-06-05 DIAGNOSIS — E83.52 HYPERCALCEMIA: ICD-10-CM

## 2024-06-05 DIAGNOSIS — E11.22 TYPE 2 DIABETES MELLITUS WITH STAGE 3A CHRONIC KIDNEY DISEASE, WITHOUT LONG-TERM CURRENT USE OF INSULIN (HCC): ICD-10-CM

## 2024-06-05 DIAGNOSIS — I10 ESSENTIAL HYPERTENSION: Primary | ICD-10-CM

## 2024-06-05 DIAGNOSIS — N18.31 TYPE 2 DIABETES MELLITUS WITH STAGE 3A CHRONIC KIDNEY DISEASE, WITHOUT LONG-TERM CURRENT USE OF INSULIN (HCC): ICD-10-CM

## 2024-06-06 DIAGNOSIS — I10 ESSENTIAL HYPERTENSION: ICD-10-CM

## 2024-06-06 RX ORDER — CHLORTHALIDONE 25 MG/1
25 TABLET ORAL DAILY
Qty: 90 TABLET | Refills: 0 | Status: SHIPPED | OUTPATIENT
Start: 2024-06-06

## 2024-06-06 RX ORDER — ROSUVASTATIN CALCIUM 5 MG/1
5 TABLET, COATED ORAL DAILY
Qty: 90 TABLET | Refills: 0 | Status: SHIPPED | OUTPATIENT
Start: 2024-06-06

## 2024-06-06 NOTE — TELEPHONE ENCOUNTER
From: Deidra Noonan  To: Dr. Annabella Fletcher  Sent: 6/5/2024 4:51 PM EDT  Subject: Prescription Refills     I know that Dr. Fletcher is no longer with this practice but I’m in a dilemma. I wasn’t able to get an appointment with a new doctor until July 2,2024. I have 2 prescriptions for Dr. Fletcher without refills. My new doctor’s office said they can’t help me with enough medication to make it until my appointment. Please let me know how to handle this situation. Thanks!

## 2024-06-25 LAB
BUN SERPL-MCNC: 23 MG/DL (ref 8–27)
BUN/CREAT SERPL: 18 (ref 12–28)
CALCIUM SERPL-MCNC: 10.2 MG/DL (ref 8.7–10.3)
CHLORIDE SERPL-SCNC: 98 MMOL/L (ref 96–106)
CO2 SERPL-SCNC: 26 MMOL/L (ref 20–29)
CREAT SERPL-MCNC: 1.25 MG/DL (ref 0.57–1)
EGFRCR SERPLBLD CKD-EPI 2021: 47 ML/MIN/1.73
GLUCOSE SERPL-MCNC: 112 MG/DL (ref 70–99)
POTASSIUM SERPL-SCNC: 5 MMOL/L (ref 3.5–5.2)
REPORT: NORMAL
SODIUM SERPL-SCNC: 136 MMOL/L (ref 134–144)

## 2024-06-28 RX ORDER — METFORMIN HYDROCHLORIDE 500 MG/1
500 TABLET, EXTENDED RELEASE ORAL 2 TIMES DAILY
Qty: 30 TABLET | OUTPATIENT
Start: 2024-06-28

## 2024-06-29 SDOH — HEALTH STABILITY: PHYSICAL HEALTH: ON AVERAGE, HOW MANY MINUTES DO YOU ENGAGE IN EXERCISE AT THIS LEVEL?: 60 MIN

## 2024-06-29 SDOH — HEALTH STABILITY: PHYSICAL HEALTH: ON AVERAGE, HOW MANY DAYS PER WEEK DO YOU ENGAGE IN MODERATE TO STRENUOUS EXERCISE (LIKE A BRISK WALK)?: 1 DAY

## 2024-07-02 ENCOUNTER — OFFICE VISIT (OUTPATIENT)
Age: 69
End: 2024-07-02
Payer: MEDICARE

## 2024-07-02 VITALS
WEIGHT: 167.2 LBS | BODY MASS INDEX: 30.77 KG/M2 | RESPIRATION RATE: 14 BRPM | HEART RATE: 60 BPM | OXYGEN SATURATION: 98 % | SYSTOLIC BLOOD PRESSURE: 124 MMHG | HEIGHT: 62 IN | DIASTOLIC BLOOD PRESSURE: 72 MMHG

## 2024-07-02 DIAGNOSIS — K21.9 GASTROESOPHAGEAL REFLUX DISEASE WITHOUT ESOPHAGITIS: ICD-10-CM

## 2024-07-02 DIAGNOSIS — N18.31 TYPE 2 DIABETES MELLITUS WITH STAGE 3A CHRONIC KIDNEY DISEASE, WITHOUT LONG-TERM CURRENT USE OF INSULIN (HCC): Primary | ICD-10-CM

## 2024-07-02 DIAGNOSIS — D57.3 SICKLE CELL TRAIT (HCC): ICD-10-CM

## 2024-07-02 DIAGNOSIS — E11.22 TYPE 2 DIABETES MELLITUS WITH STAGE 3A CHRONIC KIDNEY DISEASE, WITHOUT LONG-TERM CURRENT USE OF INSULIN (HCC): Primary | ICD-10-CM

## 2024-07-02 DIAGNOSIS — N18.31 TYPE 2 DIABETES MELLITUS WITH STAGE 3A CHRONIC KIDNEY DISEASE, WITHOUT LONG-TERM CURRENT USE OF INSULIN (HCC): ICD-10-CM

## 2024-07-02 DIAGNOSIS — I10 ESSENTIAL HYPERTENSION: ICD-10-CM

## 2024-07-02 DIAGNOSIS — E11.22 TYPE 2 DIABETES MELLITUS WITH STAGE 3A CHRONIC KIDNEY DISEASE, WITHOUT LONG-TERM CURRENT USE OF INSULIN (HCC): ICD-10-CM

## 2024-07-02 PROBLEM — H69.92 DYSFUNCTION OF LEFT EUSTACHIAN TUBE: Status: RESOLVED | Noted: 2022-11-21 | Resolved: 2024-07-02

## 2024-07-02 PROBLEM — E16.2 HYPOGLYCEMIA: Status: RESOLVED | Noted: 2021-08-10 | Resolved: 2024-07-02

## 2024-07-02 PROBLEM — M79.602 LEFT ARM PAIN: Status: RESOLVED | Noted: 2021-05-26 | Resolved: 2024-07-02

## 2024-07-02 PROBLEM — R13.10 DYSPHAGIA, UNSPECIFIED TYPE: Status: RESOLVED | Noted: 2022-09-20 | Resolved: 2024-07-02

## 2024-07-02 PROCEDURE — 1123F ACP DISCUSS/DSCN MKR DOCD: CPT | Performed by: INTERNAL MEDICINE

## 2024-07-02 PROCEDURE — 99214 OFFICE O/P EST MOD 30 MIN: CPT | Performed by: INTERNAL MEDICINE

## 2024-07-02 PROCEDURE — 3074F SYST BP LT 130 MM HG: CPT | Performed by: INTERNAL MEDICINE

## 2024-07-02 PROCEDURE — 3078F DIAST BP <80 MM HG: CPT | Performed by: INTERNAL MEDICINE

## 2024-07-02 RX ORDER — METFORMIN HYDROCHLORIDE 500 MG/1
500 TABLET, EXTENDED RELEASE ORAL 2 TIMES DAILY
Qty: 180 TABLET | Refills: 4 | Status: SHIPPED | OUTPATIENT
Start: 2024-07-02

## 2024-07-02 ASSESSMENT — PATIENT HEALTH QUESTIONNAIRE - PHQ9
1. LITTLE INTEREST OR PLEASURE IN DOING THINGS: NEARLY EVERY DAY
SUM OF ALL RESPONSES TO PHQ QUESTIONS 1-9: 6
SUM OF ALL RESPONSES TO PHQ9 QUESTIONS 1 & 2: 6
SUM OF ALL RESPONSES TO PHQ QUESTIONS 1-9: 6
2. FEELING DOWN, DEPRESSED OR HOPELESS: NEARLY EVERY DAY

## 2024-07-02 NOTE — PROGRESS NOTES
She is followed by cardiology for PVCs, is on flecainide. Ms. Noonan sees nephrology for CKD3. She sees psychiatry for depression.       Patient Active Problem List   Diagnosis    Balance problem    Gastroesophageal reflux disease without esophagitis    Severe obesity (BMI 35.0-35.9 with comorbidity) (HCC)    Iron deficiency anemia    Anxiety    Insomnia, unspecified type    PVC (premature ventricular contraction)    Hyperlipidemia, unspecified hyperlipidemia type    Essential hypertension    PAC (premature atrial contraction)    Chronic pain of right knee    Impaired cognition    Type 2 diabetes mellitus with kidney complication, without long-term current use of insulin (HCC)    Mild episode of recurrent major depressive disorder (HCC)    Sickle cell trait (HCC)          History reviewed. No pertinent past medical history.       Past Surgical History:   Procedure Laterality Date    BREAST BIOPSY Right     GYN      DNC, another procedure for an abnormal pap (?LEEP)    TUBAL LIGATION Bilateral           Family History   Problem Relation Age of Onset    Heart Disease Father     High Blood Pressure Father     Anemia Mother     High Blood Pressure Paternal Grandfather           Social History     Tobacco Use    Smoking status: Never    Smokeless tobacco: Never   Vaping Use    Vaping Use: Never used   Substance Use Topics    Alcohol use: Not Currently    Drug use: Never        Current Outpatient Medications   Medication Instructions    albuterol sulfate HFA (VENTOLIN HFA) 108 (90 Base) MCG/ACT inhaler 2 puffs, Inhalation, 4 TIMES DAILY PRN, Rinse mouth with water after each use    cetirizine (ZYRTEC) 10 MG tablet Oral, DAILY PRN    chlorthalidone (HYGROTON) 25 mg, Oral, DAILY    desvenlafaxine succinate (PRISTIQ) 50 mg, Oral, EVERY MORNING    EPINEPHrine (EPIPEN) 0.3 MG/0.3ML SOAJ injection INJECT INTO OUTER THIGH FOR SEVERE ALLERGIC REACTION. CALL 911 AFTER USE.    FEROSUL 325 (65 Fe) MG tablet TAKE ONE TABLET BY

## 2024-07-03 LAB
BASOPHILS # BLD AUTO: 0.1 X10E3/UL (ref 0–0.2)
BASOPHILS NFR BLD AUTO: 1 %
EOSINOPHIL # BLD AUTO: 0.2 X10E3/UL (ref 0–0.4)
EOSINOPHIL NFR BLD AUTO: 3 %
ERYTHROCYTE [DISTWIDTH] IN BLOOD BY AUTOMATED COUNT: 14.2 % (ref 11.7–15.4)
HBA1C MFR BLD: 6.4 % (ref 4.8–5.6)
HCT VFR BLD AUTO: 34 % (ref 34–46.6)
HGB BLD-MCNC: 10.9 G/DL (ref 11.1–15.9)
IMM GRANULOCYTES # BLD AUTO: 0 X10E3/UL (ref 0–0.1)
IMM GRANULOCYTES NFR BLD AUTO: 0 %
LYMPHOCYTES # BLD AUTO: 2.4 X10E3/UL (ref 0.7–3.1)
LYMPHOCYTES NFR BLD AUTO: 32 %
MCH RBC QN AUTO: 25.6 PG (ref 26.6–33)
MCHC RBC AUTO-ENTMCNC: 32.1 G/DL (ref 31.5–35.7)
MCV RBC AUTO: 80 FL (ref 79–97)
MONOCYTES # BLD AUTO: 0.6 X10E3/UL (ref 0.1–0.9)
MONOCYTES NFR BLD AUTO: 8 %
NEUTROPHILS # BLD AUTO: 4.1 X10E3/UL (ref 1.4–7)
NEUTROPHILS NFR BLD AUTO: 56 %
PLATELET # BLD AUTO: 279 X10E3/UL (ref 150–450)
RBC # BLD AUTO: 4.25 X10E6/UL (ref 3.77–5.28)
WBC # BLD AUTO: 7.4 X10E3/UL (ref 3.4–10.8)

## 2024-07-08 DIAGNOSIS — E61.1 IRON DEFICIENCY: ICD-10-CM

## 2024-07-08 RX ORDER — FERROUS SULFATE 325(65) MG
1 TABLET ORAL DAILY
Qty: 90 TABLET | Refills: 4 | Status: SHIPPED | OUTPATIENT
Start: 2024-07-08

## 2024-07-08 NOTE — TELEPHONE ENCOUNTER
PCP: Kizzy Hoffman MD    Last appt: 7/2/2024   Future Appointments   Date Time Provider Department Center   1/2/2025 11:00 AM Kizzy Hoffman MD [facility] BS AMB       Requested Prescriptions     Pending Prescriptions Disp Refills    ferrous sulfate (FEROSUL) 325 (65 Fe) MG tablet 90 tablet 3     Sig: Take 1 tablet by mouth daily

## 2024-08-12 RX ORDER — SPIRONOLACTONE 100 MG/1
TABLET, FILM COATED ORAL
Qty: 90 TABLET | Refills: 3 | Status: SHIPPED | OUTPATIENT
Start: 2024-08-12 | End: 2024-08-12 | Stop reason: SDUPTHER

## 2024-08-12 RX ORDER — SPIRONOLACTONE 100 MG/1
TABLET, FILM COATED ORAL
Qty: 90 TABLET | Refills: 3 | Status: SHIPPED | OUTPATIENT
Start: 2024-08-12

## 2024-08-12 NOTE — TELEPHONE ENCOUNTER
PCP: Kizzy Hoffman MD    Last appt: 7/2/2024   Future Appointments   Date Time Provider Department Center   1/2/2025 11:00 AM Kizzy Hoffman MD [facility] DEP       Requested Prescriptions     Pending Prescriptions Disp Refills    spironolactone (ALDACTONE) 100 MG tablet 90 tablet 3     Sig: TAKE ONE TABLET BY MOUTH EVERY DAY FOR HIGH BLOOD PRESSURE.     Pamela \"Keith\" PRESLEY KayN

## 2024-09-04 DIAGNOSIS — I10 ESSENTIAL HYPERTENSION: ICD-10-CM

## 2024-09-05 RX ORDER — CHLORTHALIDONE 25 MG/1
25 TABLET ORAL DAILY
Qty: 90 TABLET | Refills: 1 | Status: SHIPPED | OUTPATIENT
Start: 2024-09-05

## 2024-09-09 DIAGNOSIS — E11.22 TYPE 2 DIABETES MELLITUS WITH STAGE 3A CHRONIC KIDNEY DISEASE, WITHOUT LONG-TERM CURRENT USE OF INSULIN (HCC): ICD-10-CM

## 2024-09-09 DIAGNOSIS — E83.52 HYPERCALCEMIA: ICD-10-CM

## 2024-09-09 DIAGNOSIS — N18.31 TYPE 2 DIABETES MELLITUS WITH STAGE 3A CHRONIC KIDNEY DISEASE, WITHOUT LONG-TERM CURRENT USE OF INSULIN (HCC): ICD-10-CM

## 2024-09-09 RX ORDER — ROSUVASTATIN CALCIUM 5 MG/1
5 TABLET, COATED ORAL DAILY
Qty: 90 TABLET | Refills: 4 | Status: SHIPPED | OUTPATIENT
Start: 2024-09-09

## 2024-09-19 ENCOUNTER — HOSPITAL ENCOUNTER (OUTPATIENT)
Facility: HOSPITAL | Age: 69
Discharge: HOME | End: 2024-09-22
Payer: MEDICARE

## 2024-09-19 DIAGNOSIS — M75.81 TENDINITIS OF RIGHT ROTATOR CUFF: ICD-10-CM

## 2024-09-19 DIAGNOSIS — M75.31 CALCIFIC TENDINITIS OF RIGHT SHOULDER: ICD-10-CM

## 2024-09-19 PROCEDURE — 73221 MRI JOINT UPR EXTREM W/O DYE: CPT

## 2024-12-06 RX ORDER — PANTOPRAZOLE SODIUM 40 MG/1
40 TABLET, DELAYED RELEASE ORAL DAILY
Qty: 90 TABLET | Refills: 4 | Status: SHIPPED | OUTPATIENT
Start: 2024-12-06

## 2024-12-17 RX ORDER — NEBIVOLOL 10 MG/1
10 TABLET ORAL DAILY
Qty: 90 TABLET | Refills: 4 | Status: SHIPPED | OUTPATIENT
Start: 2024-12-17

## 2024-12-17 NOTE — TELEPHONE ENCOUNTER
PCP: Kizzy Hoffman MD    Last Appointment: Visit date not found    Future Appointments   Date Time Provider Department Center   1/2/2025 11:00 AM Kizzy Hoffman MD [facility] DEP       Requested Prescriptions     Pending Prescriptions Disp Refills    nebivolol (BYSTOLIC) 10 MG tablet 90 tablet 2     Sig: Take 1 tablet by mouth daily       LAST ORDERED: Rx new to provider      Pamela \"Keith\" AISSATOU Kay

## 2024-12-30 ENCOUNTER — TELEPHONE (OUTPATIENT)
Facility: CLINIC | Age: 69
End: 2024-12-30

## 2024-12-30 PROBLEM — N18.31 CHRONIC KIDNEY DISEASE, STAGE 3A (HCC): Status: ACTIVE | Noted: 2024-12-30

## 2024-12-30 NOTE — TELEPHONE ENCOUNTER
Nitish hernández [place] called to inform the provider that they are faxing over a medical request. He will like a call back to confirm that the office receive the request.   Phone: [phone]        reference number [id number]

## 2025-01-02 ENCOUNTER — OFFICE VISIT (OUTPATIENT)
Facility: CLINIC | Age: 70
End: 2025-01-02
Payer: MEDICARE

## 2025-01-02 VITALS
BODY MASS INDEX: 31.65 KG/M2 | RESPIRATION RATE: 14 BRPM | HEIGHT: 62 IN | WEIGHT: 172 LBS | DIASTOLIC BLOOD PRESSURE: 54 MMHG | SYSTOLIC BLOOD PRESSURE: 127 MMHG | HEART RATE: 61 BPM | OXYGEN SATURATION: 97 %

## 2025-01-02 DIAGNOSIS — E78.5 HYPERLIPIDEMIA, UNSPECIFIED HYPERLIPIDEMIA TYPE: ICD-10-CM

## 2025-01-02 DIAGNOSIS — Z12.31 ENCOUNTER FOR SCREENING MAMMOGRAM FOR BREAST CANCER: ICD-10-CM

## 2025-01-02 DIAGNOSIS — E11.22 TYPE 2 DIABETES MELLITUS WITH STAGE 3A CHRONIC KIDNEY DISEASE, WITHOUT LONG-TERM CURRENT USE OF INSULIN (HCC): ICD-10-CM

## 2025-01-02 DIAGNOSIS — Z00.00 MEDICARE ANNUAL WELLNESS VISIT, SUBSEQUENT: Primary | ICD-10-CM

## 2025-01-02 DIAGNOSIS — N18.31 CHRONIC KIDNEY DISEASE, STAGE 3A (HCC): ICD-10-CM

## 2025-01-02 DIAGNOSIS — F33.0 MILD EPISODE OF RECURRENT MAJOR DEPRESSIVE DISORDER (HCC): ICD-10-CM

## 2025-01-02 DIAGNOSIS — N18.31 TYPE 2 DIABETES MELLITUS WITH STAGE 3A CHRONIC KIDNEY DISEASE, WITHOUT LONG-TERM CURRENT USE OF INSULIN (HCC): ICD-10-CM

## 2025-01-02 DIAGNOSIS — I10 ESSENTIAL HYPERTENSION: ICD-10-CM

## 2025-01-02 PROBLEM — M85.80 OSTEOPENIA: Status: ACTIVE | Noted: 2025-01-02

## 2025-01-02 PROCEDURE — 3078F DIAST BP <80 MM HG: CPT | Performed by: INTERNAL MEDICINE

## 2025-01-02 PROCEDURE — 99214 OFFICE O/P EST MOD 30 MIN: CPT | Performed by: INTERNAL MEDICINE

## 2025-01-02 PROCEDURE — 1123F ACP DISCUSS/DSCN MKR DOCD: CPT | Performed by: INTERNAL MEDICINE

## 2025-01-02 PROCEDURE — G0439 PPPS, SUBSEQ VISIT: HCPCS | Performed by: INTERNAL MEDICINE

## 2025-01-02 PROCEDURE — 3074F SYST BP LT 130 MM HG: CPT | Performed by: INTERNAL MEDICINE

## 2025-01-02 RX ORDER — PIMECROLIMUS 10 MG/G
CREAM TOPICAL
COMMUNITY
Start: 2024-10-30

## 2025-01-02 RX ORDER — EMPAGLIFLOZIN 10 MG/1
10 TABLET, FILM COATED ORAL DAILY
COMMUNITY
Start: 2024-12-06

## 2025-01-02 RX ORDER — HYDROXYZINE HYDROCHLORIDE 10 MG/1
10 TABLET, FILM COATED ORAL
COMMUNITY
Start: 2024-07-18

## 2025-01-02 RX ORDER — DICLOFENAC SODIUM 75 MG/1
75 TABLET, DELAYED RELEASE ORAL 2 TIMES DAILY
COMMUNITY
Start: 2024-09-10

## 2025-01-02 ASSESSMENT — PATIENT HEALTH QUESTIONNAIRE - PHQ9
SUM OF ALL RESPONSES TO PHQ QUESTIONS 1-9: 6
SUM OF ALL RESPONSES TO PHQ9 QUESTIONS 1 & 2: 6
2. FEELING DOWN, DEPRESSED OR HOPELESS: NEARLY EVERY DAY
1. LITTLE INTEREST OR PLEASURE IN DOING THINGS: NEARLY EVERY DAY
SUM OF ALL RESPONSES TO PHQ QUESTIONS 1-9: 6

## 2025-01-02 NOTE — PATIENT INSTRUCTIONS
nuts, seeds, and soy products.     Limit drinks and foods with added sugar. These include candy, desserts, and soda pop.   Heart-healthy lifestyle    If your doctor recommends it, get more exercise. For many people, walking is a good choice. Or you may want to swim, bike, or do other activities. Bit by bit, increase the time you're active every day. Try for at least 30 minutes on most days of the week.     Try to quit or cut back on using tobacco and other nicotine products. This includes smoking and vaping. If you need help quitting, talk to your doctor about stop-smoking programs and medicines. These can increase your chances of quitting for good. Quitting is one of the most important things you can do to protect your heart. It is never too late to quit. Try to avoid secondhand smoke too.     Stay at a weight that's healthy for you. Talk to your doctor if you need help losing weight.     Try to get 7 to 9 hours of sleep each night.     Limit alcohol to 2 drinks a day for men and 1 drink a day for women. Too much alcohol can cause health problems.     Manage other health problems such as diabetes, high blood pressure, and high cholesterol. If you think you may have a problem with alcohol or drug use, talk to your doctor.   Medicines    Take your medicines exactly as prescribed. Call your doctor if you think you are having a problem with your medicine.     If your doctor recommends aspirin, take the amount directed each day. Make sure you take aspirin and not another kind of pain reliever, such as acetaminophen (Tylenol).   When should you call for help?   Call 911 if you have symptoms of a heart attack. These may include:    Chest pain or pressure, or a strange feeling in the chest.     Sweating.     Shortness of breath.     Pain, pressure, or a strange feeling in the back, neck, jaw, or upper belly or in one or both shoulders or arms.     Lightheadedness or sudden weakness.     A fast or irregular heartbeat.

## 2025-01-02 NOTE — PROGRESS NOTES
Weight: 78 kg (172 lb)   Height: 1.575 m (5' 2\")      Body mass index is 31.46 kg/m².          General: well nourished, well appearing, in no distress  Heart: regular, normal rate, no murmurs noted, distal pulses 2+ bilaterally, no pitting peripheral edema  Lungs: good air movement, clear to auscultation bilaterally, no wheezing or rales noted   Psych: full, appropriate affect, normal speech PHQ2-score: 0  Neuro: alert, oriented x 3, Mini Cog 3/3 recall with normal clock, achilles reflexes are 1+ bilaterally, sensation intact with monofilament testing in both feet  Skin: no sores, ulcers or calluses on feet               Allergies   Allergen Reactions    Ace Inhibitors Anaphylaxis and Angioedema     Use an epi pen     Lisinopril      Other reaction(s): lip swelling  Other reaction(s): lip swelling       Prior to Visit Medications    Medication Sig Taking? Authorizing Provider   JARDIANCE 10 MG tablet Take 1 tablet by mouth daily Yes Elmer Gutierrez MD   diclofenac (VOLTAREN) 75 MG EC tablet Take 1 tablet by mouth 2 times daily Yes Elmer Gutierrez MD   hydrOXYzine HCl (ATARAX) 10 MG tablet Take 1 tablet by mouth Yes Elmer Gutierrez MD   pimecrolimus (ELIDEL) 1 % cream APPLY 1 GRAM TO DRY PATCHES FACE TWICE DAILY AS NEEDED Yes Elmer Gutierrez MD   nebivolol (BYSTOLIC) 10 MG tablet Take 1 tablet by mouth daily Yes Kizzy Hoffman MD   pantoprazole (PROTONIX) 40 MG tablet TAKE ONE TABLET BY MOUTH DAILY Yes Kizzy Hoffman MD   rosuvastatin (CRESTOR) 5 MG tablet TAKE ONE TABLET BY MOUTH EVERY DAY Yes Kizzy Hoffman MD   spironolactone (ALDACTONE) 100 MG tablet TAKE ONE TABLET BY MOUTH EVERY DAY FOR HIGH BLOOD PRESSURE. Yes Kizzy Hoffman MD   ferrous sulfate (FEROSUL) 325 (65 Fe) MG tablet Take 1 tablet by mouth daily Yes Kizzy Hoffman MD   metFORMIN (GLUCOPHAGE-XR) 500 MG extended release tablet Take 1 tablet by mouth 2 times daily Yes Kizzy Hoffman MD   desvenlafaxine

## 2025-01-03 LAB
ALBUMIN SERPL-MCNC: 4.7 G/DL (ref 3.9–4.9)
ALBUMIN/CREAT UR: 22 MG/G CREAT (ref 0–29)
ALP SERPL-CCNC: 98 IU/L (ref 44–121)
ALT SERPL-CCNC: 31 IU/L (ref 0–32)
AST SERPL-CCNC: 21 IU/L (ref 0–40)
BASOPHILS # BLD AUTO: 0 X10E3/UL (ref 0–0.2)
BASOPHILS NFR BLD AUTO: 1 %
BILIRUB SERPL-MCNC: 0.3 MG/DL (ref 0–1.2)
BUN SERPL-MCNC: 24 MG/DL (ref 8–27)
BUN/CREAT SERPL: 16 (ref 12–28)
CALCIUM SERPL-MCNC: 10 MG/DL (ref 8.7–10.3)
CHLORIDE SERPL-SCNC: 103 MMOL/L (ref 96–106)
CHOLEST SERPL-MCNC: 174 MG/DL (ref 100–199)
CO2 SERPL-SCNC: 21 MMOL/L (ref 20–29)
CREAT SERPL-MCNC: 1.53 MG/DL (ref 0.57–1)
CREAT UR-MCNC: 23.1 MG/DL
EGFRCR SERPLBLD CKD-EPI 2021: 37 ML/MIN/1.73
EOSINOPHIL # BLD AUTO: 0.2 X10E3/UL (ref 0–0.4)
EOSINOPHIL NFR BLD AUTO: 4 %
ERYTHROCYTE [DISTWIDTH] IN BLOOD BY AUTOMATED COUNT: 13.7 % (ref 11.7–15.4)
GLOBULIN SER CALC-MCNC: 2.8 G/DL (ref 1.5–4.5)
GLUCOSE SERPL-MCNC: 90 MG/DL (ref 70–99)
HBA1C MFR BLD: 5.8 % (ref 4.8–5.6)
HCT VFR BLD AUTO: 34.1 % (ref 34–46.6)
HDLC SERPL-MCNC: 50 MG/DL
HGB BLD-MCNC: 10.7 G/DL (ref 11.1–15.9)
IMM GRANULOCYTES # BLD AUTO: 0 X10E3/UL (ref 0–0.1)
IMM GRANULOCYTES NFR BLD AUTO: 0 %
LDLC SERPL CALC-MCNC: 98 MG/DL (ref 0–99)
LYMPHOCYTES # BLD AUTO: 2 X10E3/UL (ref 0.7–3.1)
LYMPHOCYTES NFR BLD AUTO: 38 %
MCH RBC QN AUTO: 25.5 PG (ref 26.6–33)
MCHC RBC AUTO-ENTMCNC: 31.4 G/DL (ref 31.5–35.7)
MCV RBC AUTO: 81 FL (ref 79–97)
MICROALBUMIN UR-MCNC: 5 UG/ML
MONOCYTES # BLD AUTO: 0.5 X10E3/UL (ref 0.1–0.9)
MONOCYTES NFR BLD AUTO: 9 %
NEUTROPHILS # BLD AUTO: 2.5 X10E3/UL (ref 1.4–7)
NEUTROPHILS NFR BLD AUTO: 48 %
PLATELET # BLD AUTO: 271 X10E3/UL (ref 150–450)
POTASSIUM SERPL-SCNC: 5.1 MMOL/L (ref 3.5–5.2)
PROT SERPL-MCNC: 7.5 G/DL (ref 6–8.5)
RBC # BLD AUTO: 4.2 X10E6/UL (ref 3.77–5.28)
SODIUM SERPL-SCNC: 141 MMOL/L (ref 134–144)
TRIGL SERPL-MCNC: 149 MG/DL (ref 0–149)
VLDLC SERPL CALC-MCNC: 26 MG/DL (ref 5–40)
WBC # BLD AUTO: 5.1 X10E3/UL (ref 3.4–10.8)

## 2025-01-04 LAB
IMP & REVIEW OF LAB RESULTS: NORMAL
Lab: NORMAL
REPORT: NORMAL
REPORT: NORMAL

## 2025-04-20 ENCOUNTER — PATIENT MESSAGE (OUTPATIENT)
Facility: CLINIC | Age: 70
End: 2025-04-20

## 2025-07-02 ENCOUNTER — OFFICE VISIT (OUTPATIENT)
Facility: CLINIC | Age: 70
End: 2025-07-02
Payer: MEDICARE

## 2025-07-02 VITALS
HEART RATE: 69 BPM | OXYGEN SATURATION: 96 % | SYSTOLIC BLOOD PRESSURE: 134 MMHG | WEIGHT: 163.6 LBS | HEIGHT: 62 IN | BODY MASS INDEX: 30.11 KG/M2 | DIASTOLIC BLOOD PRESSURE: 60 MMHG

## 2025-07-02 DIAGNOSIS — N18.31 CHRONIC KIDNEY DISEASE, STAGE 3A (HCC): ICD-10-CM

## 2025-07-02 DIAGNOSIS — E11.22 TYPE 2 DIABETES MELLITUS WITH STAGE 3A CHRONIC KIDNEY DISEASE, WITHOUT LONG-TERM CURRENT USE OF INSULIN (HCC): Primary | ICD-10-CM

## 2025-07-02 DIAGNOSIS — N18.31 TYPE 2 DIABETES MELLITUS WITH STAGE 3A CHRONIC KIDNEY DISEASE, WITHOUT LONG-TERM CURRENT USE OF INSULIN (HCC): Primary | ICD-10-CM

## 2025-07-02 DIAGNOSIS — I10 ESSENTIAL HYPERTENSION: ICD-10-CM

## 2025-07-02 DIAGNOSIS — N18.31 TYPE 2 DIABETES MELLITUS WITH STAGE 3A CHRONIC KIDNEY DISEASE, WITHOUT LONG-TERM CURRENT USE OF INSULIN (HCC): ICD-10-CM

## 2025-07-02 DIAGNOSIS — E11.22 TYPE 2 DIABETES MELLITUS WITH STAGE 3A CHRONIC KIDNEY DISEASE, WITHOUT LONG-TERM CURRENT USE OF INSULIN (HCC): ICD-10-CM

## 2025-07-02 PROCEDURE — 3075F SYST BP GE 130 - 139MM HG: CPT | Performed by: INTERNAL MEDICINE

## 2025-07-02 PROCEDURE — 1123F ACP DISCUSS/DSCN MKR DOCD: CPT | Performed by: INTERNAL MEDICINE

## 2025-07-02 PROCEDURE — 3078F DIAST BP <80 MM HG: CPT | Performed by: INTERNAL MEDICINE

## 2025-07-02 PROCEDURE — 1126F AMNT PAIN NOTED NONE PRSNT: CPT | Performed by: INTERNAL MEDICINE

## 2025-07-02 PROCEDURE — 3044F HG A1C LEVEL LT 7.0%: CPT | Performed by: INTERNAL MEDICINE

## 2025-07-02 PROCEDURE — 99214 OFFICE O/P EST MOD 30 MIN: CPT | Performed by: INTERNAL MEDICINE

## 2025-07-02 PROCEDURE — 1160F RVW MEDS BY RX/DR IN RCRD: CPT | Performed by: INTERNAL MEDICINE

## 2025-07-02 PROCEDURE — 1159F MED LIST DOCD IN RCRD: CPT | Performed by: INTERNAL MEDICINE

## 2025-07-02 RX ORDER — HYDRALAZINE HYDROCHLORIDE 100 MG/1
100 TABLET, FILM COATED ORAL 3 TIMES DAILY
COMMUNITY

## 2025-07-02 NOTE — PROGRESS NOTES
A/P:        ICD-10-CM    1. Type 2 diabetes mellitus with stage 3a chronic kidney disease, without long-term current use of insulin (HCC)  E11.22     N18.31       2. Essential hypertension  I10       3. Chronic kidney disease, stage 3a (HCC)  N18.31                 Assessment & Plan  1. Hypertension: Stable, at goal on repeat.   - Blood pressure readings have been inconsistent, improved on exam today.  - Recent stress is likely contributing to elevated blood pressures.  - Advised to monitor blood pressure and pulse regularly at home, ensuring pulse does not fall below 60 beats per minute.  - Current regimen of spironolactone 100  mg daily , Nebivolol 20 mg daily, and hydralazine 100 mg three times daily appears effective.  - If blood pressure readings decrease, inform for medication adjustment.    2. Diabetes Mellitus: Stable.  - A1c level was <6% during the last check, indicating good control of blood sugar levels.  - Currently on Jardiance and metformin 500 mg twice daily.  - An A1c test will be conducted today to monitor blood sugar levels.  - Continue current doses of metformin and Jardiance as long as blood sugar levels remain stable.    3. CKD: Stage 3a CKD, stable, followed by nephrology  - A kidney function test will be performed to ensure stability, especially given the recent change in blood pressure.             Follow up: 6 months AWV        An electronic signature was used to authenticate this note.    --Kizzy Hoffman MD         HPI: Deidra Noonan is here for a follow up visit:      History of Present Illness  The patient is a 69-year-old female here for follow-up on her diabetes and blood pressure.    She reports that the process of measuring her blood pressure induces anxiety, as she is aware of its elevated state. She has been under the care of a nephrologist since May 2025. Her home blood pressure readings were around 160/69 in mid-June 2025, prompting her to consult with her nephrologist.

## 2025-07-04 LAB
ANION GAP SERPL CALC-SCNC: 3 MMOL/L (ref 2–12)
BUN SERPL-MCNC: 13 MG/DL (ref 6–20)
BUN/CREAT SERPL: 8 (ref 12–20)
CALCIUM SERPL-MCNC: 9.8 MG/DL (ref 8.5–10.1)
CHLORIDE SERPL-SCNC: 103 MMOL/L (ref 97–108)
CO2 SERPL-SCNC: 32 MMOL/L (ref 21–32)
CREAT SERPL-MCNC: 1.54 MG/DL (ref 0.55–1.02)
EST. AVERAGE GLUCOSE BLD GHB EST-MCNC: 126 MG/DL
GLUCOSE SERPL-MCNC: 97 MG/DL (ref 65–100)
HBA1C MFR BLD: 6 % (ref 4–5.6)
POTASSIUM SERPL-SCNC: 5.1 MMOL/L (ref 3.5–5.1)
SODIUM SERPL-SCNC: 138 MMOL/L (ref 136–145)

## 2025-07-14 DIAGNOSIS — E61.1 IRON DEFICIENCY: ICD-10-CM

## 2025-07-14 RX ORDER — FERROUS SULFATE 325(65) MG
1 TABLET ORAL DAILY
Qty: 90 TABLET | Refills: 4 | Status: SHIPPED | OUTPATIENT
Start: 2025-07-14

## 2025-07-14 NOTE — TELEPHONE ENCOUNTER
PCP: Kizzy Hoffman MD    Last Appointment: 7/2/2025    Future Appointments   Date Time Provider Department Center   1/2/2026 11:00 AM Kizzy Hoffman MD [facility] DEP       Requested Prescriptions     Pending Prescriptions Disp Refills    FEROSUL 325 (65 Fe) MG tablet [Pharmacy Med Name: FeroSul 325 mg (65 mg iron) tablet] 90 tablet 4     Sig: TAKE ONE TABLET BY MOUTH EVERY DAY       LAST ORDERED: 7/8/24      Pamela \"[place]\" AISSATOU Kay

## 2025-07-16 DIAGNOSIS — E11.22 TYPE 2 DIABETES MELLITUS WITH STAGE 3A CHRONIC KIDNEY DISEASE, WITHOUT LONG-TERM CURRENT USE OF INSULIN (HCC): ICD-10-CM

## 2025-07-16 DIAGNOSIS — N18.31 TYPE 2 DIABETES MELLITUS WITH STAGE 3A CHRONIC KIDNEY DISEASE, WITHOUT LONG-TERM CURRENT USE OF INSULIN (HCC): ICD-10-CM

## 2025-07-16 RX ORDER — METFORMIN HYDROCHLORIDE 500 MG/1
500 TABLET, EXTENDED RELEASE ORAL 2 TIMES DAILY
Qty: 180 TABLET | Refills: 4 | Status: SHIPPED | OUTPATIENT
Start: 2025-07-16